# Patient Record
Sex: FEMALE | Race: OTHER | Employment: UNEMPLOYED | URBAN - METROPOLITAN AREA
[De-identification: names, ages, dates, MRNs, and addresses within clinical notes are randomized per-mention and may not be internally consistent; named-entity substitution may affect disease eponyms.]

---

## 2017-09-19 ENCOUNTER — HOSPITAL ENCOUNTER (OUTPATIENT)
Dept: LAB | Age: 27
Discharge: HOME | End: 2017-09-19

## 2017-09-19 ENCOUNTER — OFFICE VISIT (OUTPATIENT)
Dept: FAMILY MEDICINE CLINIC | Age: 27
End: 2017-09-19

## 2017-09-19 VITALS
HEART RATE: 44 BPM | WEIGHT: 157 LBS | DIASTOLIC BLOOD PRESSURE: 74 MMHG | TEMPERATURE: 97.6 F | SYSTOLIC BLOOD PRESSURE: 117 MMHG | BODY MASS INDEX: 26.8 KG/M2 | HEIGHT: 64 IN

## 2017-09-19 DIAGNOSIS — R07.9 CHEST PAIN, UNSPECIFIED TYPE: Primary | ICD-10-CM

## 2017-09-19 DIAGNOSIS — R07.9 CHEST PAIN, UNSPECIFIED TYPE: ICD-10-CM

## 2017-09-19 LAB
ALBUMIN SERPL-MCNC: 4.3 G/DL (ref 3.5–5)
ALBUMIN/GLOB SERPL: 1.1 {RATIO} (ref 1.1–2.2)
ALP SERPL-CCNC: 69 U/L (ref 45–117)
ALT SERPL-CCNC: 31 U/L (ref 12–78)
ANION GAP SERPL CALC-SCNC: 8 MMOL/L (ref 5–15)
AST SERPL-CCNC: 22 U/L (ref 15–37)
BASOPHILS # BLD: 0 K/UL (ref 0–0.1)
BASOPHILS NFR BLD: 0 % (ref 0–1)
BILIRUB SERPL-MCNC: 0.6 MG/DL (ref 0.2–1)
BUN SERPL-MCNC: 10 MG/DL (ref 6–20)
BUN/CREAT SERPL: 14 (ref 12–20)
CALCIUM SERPL-MCNC: 8.9 MG/DL (ref 8.5–10.1)
CHLORIDE SERPL-SCNC: 104 MMOL/L (ref 97–108)
CHOLEST SERPL-MCNC: 155 MG/DL
CO2 SERPL-SCNC: 29 MMOL/L (ref 21–32)
CREAT SERPL-MCNC: 0.72 MG/DL (ref 0.55–1.02)
EOSINOPHIL # BLD: 0.2 K/UL (ref 0–0.4)
EOSINOPHIL NFR BLD: 3 % (ref 0–7)
ERYTHROCYTE [DISTWIDTH] IN BLOOD BY AUTOMATED COUNT: 13.3 % (ref 11.5–14.5)
EST. AVERAGE GLUCOSE BLD GHB EST-MCNC: 117 MG/DL
GLOBULIN SER CALC-MCNC: 4 G/DL (ref 2–4)
GLUCOSE SERPL-MCNC: 83 MG/DL (ref 65–100)
HBA1C MFR BLD: 5.7 % (ref 4.2–6.3)
HCT VFR BLD AUTO: 38.4 % (ref 35–47)
HDLC SERPL-MCNC: 66 MG/DL
HDLC SERPL: 2.3 {RATIO} (ref 0–5)
HGB BLD-MCNC: 12.6 G/DL (ref 11.5–16)
LDLC SERPL CALC-MCNC: 68.4 MG/DL (ref 0–100)
LIPID PROFILE,FLP: NORMAL
LYMPHOCYTES # BLD: 2.1 K/UL (ref 0.8–3.5)
LYMPHOCYTES NFR BLD: 34 % (ref 12–49)
MCH RBC QN AUTO: 29.1 PG (ref 26–34)
MCHC RBC AUTO-ENTMCNC: 32.8 G/DL (ref 30–36.5)
MCV RBC AUTO: 88.7 FL (ref 80–99)
MONOCYTES # BLD: 0.3 K/UL (ref 0–1)
MONOCYTES NFR BLD: 5 % (ref 5–13)
NEUTS SEG # BLD: 3.5 K/UL (ref 1.8–8)
NEUTS SEG NFR BLD: 58 % (ref 32–75)
PLATELET # BLD AUTO: 389 K/UL (ref 150–400)
POTASSIUM SERPL-SCNC: 4.3 MMOL/L (ref 3.5–5.1)
PROT SERPL-MCNC: 8.3 G/DL (ref 6.4–8.2)
RBC # BLD AUTO: 4.33 M/UL (ref 3.8–5.2)
SODIUM SERPL-SCNC: 141 MMOL/L (ref 136–145)
TRIGL SERPL-MCNC: 103 MG/DL (ref ?–150)
TSH SERPL DL<=0.05 MIU/L-ACNC: 1.66 UIU/ML (ref 0.36–3.74)
VLDLC SERPL CALC-MCNC: 20.6 MG/DL
WBC # BLD AUTO: 6.1 K/UL (ref 3.6–11)

## 2017-09-19 PROCEDURE — 80061 LIPID PANEL: CPT | Performed by: NURSE PRACTITIONER

## 2017-09-19 PROCEDURE — 85025 COMPLETE CBC W/AUTO DIFF WBC: CPT | Performed by: NURSE PRACTITIONER

## 2017-09-19 PROCEDURE — 83036 HEMOGLOBIN GLYCOSYLATED A1C: CPT | Performed by: NURSE PRACTITIONER

## 2017-09-19 PROCEDURE — 84443 ASSAY THYROID STIM HORMONE: CPT | Performed by: NURSE PRACTITIONER

## 2017-09-19 PROCEDURE — 80053 COMPREHEN METABOLIC PANEL: CPT | Performed by: NURSE PRACTITIONER

## 2017-09-19 NOTE — PROGRESS NOTES
Assessment/Plan:       ICD-10-CM ICD-9-CM    1. Chest pain, unspecified type R07.9 786.50 LIPID PANEL      METABOLIC PANEL, COMPREHENSIVE      CBC WITH AUTOMATED DIFF      HEMOGLOBIN A1C WITH EAG      TSH 3RD GENERATION       [facility] [address]  Jean Paul Nesbitt  [address] [facility] Krystal Vitale [address] [facility] [address]  Phone: [phone] Fax: [phone]      CVAN- [address]  Subjective:     Chief Complaint   Patient presents with    Chest Pain     pt c/o pain on middle upper chest and radiates to neck    Abelino Senior is a 32 y.o. OTHER female. HPI: how long? Sometimes travels to the back. Starts in the morning, 3 hours after waking up. Has coffee - sugar and milk 2%. Lasts for 45 minutes. First started Wednesday. She didn't have it much yesterday. Today she is fasting. She  has a past medical history of Abdominal pain, generalized (4/23/2013); Acute bronchitis (4/23/2013); Allergic rhinitis, cause unspecified (4/23/2013); Anxiety state, unspecified (4/23/2013); Cardiac murmur, undiagnosed (4/23/2013); Carpal tunnel syndrome (4/23/2013); Contact dermatitis and other eczema, due to unspecified cause (4/23/2013); Gestational diabetes; Headache (4/23/2013); Low back pain (4/23/2013); Other acute sinusitis (4/23/2013); Other chest pain (4/23/2013); Pap smear for cervical cancer screening (07/2012); Pap smear for cervical cancer screening (07/31/2012); Shoulder pain (4/23/2013); Throat pain (4/23/2013); and URI (4/23/2013). She has Cardiac murmur, undiagnosed and Excess weight gain in pregnancy on her problem list. Doing exercise, not eating a lot of wheat. Left upper shoulder/back hurts. Review of Systems: Negative for: fever, shortness of breath, leg swelling, exertional dyspnea, palpitations.   Current Medications:   Current Outpatient Prescriptions on File Prior to Visit   Medication Sig    fluticasone (FLONASE) 50 mcg/actuation nasal spray 2 Sprays by Both Nostrils route daily.    cetirizine (ZYRTEC) 10 mg tablet Take 1 Tab by mouth nightly.  phenol throat spray (CHLORASEPTIC THROAT SPRAY) 1.4 % spray Take 1 Spray by mouth as needed.  omeprazole (PRILOSEC) 40 mg capsule Take 1 Cap by mouth Daily (before breakfast).  triamcinolone acetonide (KENALOG) 0.1 % topical cream Apply  to affected area two (2) times a day. use thin layer to the hands; Usa 2 veces al chris para picazon en las ryan    norethindrone (MICRONOR) 0.35 mg tablet Take 1 Tab by mouth daily.  PNV#16-Iron Fum & PS-FA-OM-3 35-1-200 mg cap Take 1 Cap by mouth daily. No current facility-administered medications on file prior to visit. Past Surgical History: She  has a past surgical history that includes  delivery only. Social and Family History: She  reports that she has never smoked. She has never used smokeless tobacco. She reports that she does not drink alcohol or use illicit drugs. ; family history includes Hypertension in her mother. Objective:     Vitals:    17 1031   BP: 117/74   Pulse: (!) 44   Temp: 97.6 °F (36.4 °C)   TempSrc: Oral   Weight: 157 lb (71.2 kg)   Height: 5' 3.78\" (1.62 m)    Patient's last menstrual period was 2017. Wt Readings from Last 2 Encounters:   17 157 lb (71.2 kg)   16 166 lb (75.3 kg)     Physical Examination:  General appearance - well developed, no acute distress. Chest - clear to auscultation. Heart - regular rate and rhythm without murmurs, rubs, or gallops. Abdomen - bowel sounds present x 4, NT, ND. Extremities - pulses intact. No peripheral edema. Assessment/Plan:   Diagnoses and all orders for this visit:    1. Chest pain, unspecified type  -     LIPID PANEL; Future  -     METABOLIC PANEL, COMPREHENSIVE; Future  -     CBC WITH AUTOMATED DIFF; Future  -     HEMOGLOBIN A1C WITH EAG; Future  -     TSH 3RD GENERATION; Future      Follow-up Disposition:  Return if symptoms worsen or fail to improve.    Tylenol 500mg, 2 po bid  For headache and back pain. Takes ibuprofen for headache, yesterday. Has been getting more headaches the last 1.5 months. She feels weak, eyes swollen the next morning. Have something with your coffee, or skip the coffee. Discussed heartburn. Natalie Barney, JAIRO, FNP-BC, BC-ADM  [place] expressed understanding of this plan.

## 2017-09-19 NOTE — PROGRESS NOTES
Coordination of Care  1. Have you been to the ER, urgent care clinic since your last visit? Hospitalized since your last visit? No    2. Have you seen or consulted any other health care providers outside of the [address] [place] [address] since your last visit? Include any pap smears or colon screening. No    Medications  Medication Reconciliation Performed: no  Patient does not need refills     Learning Assessment Complete?  yes

## 2017-12-19 ENCOUNTER — OFFICE VISIT (OUTPATIENT)
Dept: FAMILY MEDICINE CLINIC | Age: 27
End: 2017-12-19

## 2017-12-19 VITALS
BODY MASS INDEX: 28.17 KG/M2 | DIASTOLIC BLOOD PRESSURE: 56 MMHG | TEMPERATURE: 97.9 F | WEIGHT: 163 LBS | SYSTOLIC BLOOD PRESSURE: 107 MMHG | HEART RATE: 53 BPM

## 2017-12-19 DIAGNOSIS — Z23 ENCOUNTER FOR IMMUNIZATION: ICD-10-CM

## 2017-12-19 DIAGNOSIS — Z13.9 ENCOUNTER FOR SCREENING: Primary | ICD-10-CM

## 2017-12-19 LAB
BILIRUB UR QL STRIP: NEGATIVE
GLUCOSE UR-MCNC: NEGATIVE MG/DL
KETONES P FAST UR STRIP-MCNC: NEGATIVE MG/DL
PH UR STRIP: 6 [PH] (ref 4.6–8)
PROT UR QL STRIP: NEGATIVE
SP GR UR STRIP: 1.01 (ref 1–1.03)
UA UROBILINOGEN AMB POC: NORMAL (ref 0.2–1)
URINALYSIS CLARITY POC: CLEAR
URINALYSIS COLOR POC: YELLOW
URINE BLOOD POC: NEGATIVE
URINE LEUKOCYTES POC: NEGATIVE
URINE NITRITES POC: NEGATIVE

## 2017-12-19 RX ORDER — CYCLOBENZAPRINE HCL 10 MG
10 TABLET ORAL
Qty: 30 TAB | Refills: 1 | Status: SHIPPED | OUTPATIENT
Start: 2017-12-19 | End: 2018-04-12

## 2017-12-19 NOTE — PATIENT INSTRUCTIONS
Dolor di en la parte baja de la espalda: Ejercicios - [ Acute Low Back Pain: Exercises ]  Instrucciones de cuidado  Éstos son algunos ejemplos de ejercicios típicos de rehabilitación para melendez afección. Comience cada ejercicio lentamente. Reduzca la intensidad del ejercicio si Holcomb Rasher a sentir dolor. Melendez médico o el fisioterapeuta le dirán cuándo puede comenzar con estos ejercicios y cuáles funcionarán mejor para usted. Cuando no esté activo, encuentre norm posición cómoda para descansar. Algunas personas se sienten cómodas en el piso o en norm cama de firmeza media con norm almohada pequeña debajo de la rosie y otra debajo de edgard rodillas. Otras personas prefieren acostarse de lado con norm almohada entre las rodillas. No permanezca en norm posición por [facility]. Dé paseos cortos (10 a 20 minutos) cada 2 a 3 horas. Evite las pendientes, las colinas y las escaleras hasta que se sienta mejor. Sólo camine distancias que pueda manejar sin dolor, especialmente dolor en las piernas. Cómo se hacen los ejercicios  Estiramientos de la espalda    1. [facility] y las rodillas en el piso. 2. Relaje la rosie y [employer]. Arquee la espalda hacia el techo, hasta que sienta que las partes derek, media y baja se estiran agradablemente. Mantenga an estiramiento geo el tiempo que se sienta cómodo, o de 15 a 30 segundos. 3. Vuelva a la posición inicial con la espalda plana mientras está apoyado de ryan y rodillas. 4. Deje que melendez espalda se nivele empujando el [employer]. [address] (glúteos) hacia el techo. 5. Mantenga esta posición geo 15 a 30 segundos. 6. Repita de 2 a 4 veces. La atención de seguimiento es norm parte clave de melendez tratamiento y seguridad. Asegúrese de hacer y acudir a todas las citas, y llame a melendez médico si está teniendo problemas. También es norm buena idea saber los resultados de los exámenes y mantener norm lista de los medicamentos que zak.   ¿Dónde puede encontrar más información en inglés? Bryant Labella a http://luiz-tati.info/. Rolly Entiat L166 en la búsqueda para aprender más acerca de \"Dolor di en la parte baja de la espalda: Ejercicios - [ Acute Low Back Pain: Exercises ]. \"  Revisado: 21 marzo, 2017  Versión del contenido: 11.4  © 8469-4090 Healthwise, [facility]. Las instrucciones de cuidado fueron adaptadas bajo licencia por [facility] (which disclaims liability or warranty for this information). Si usted tiene [place]tholomew [place] afección médica o sobre estas instrucciones, siempre pregunte a melendez profesional de elsa. [employer], [facility] niega toda garantía o responsabilidad por melendez uso de esta información.

## 2017-12-19 NOTE — PROGRESS NOTES
HISTORY OF PRESENT ILLNESS  Naomi Brandt is a 32 y.o. female. HPI Comments: lbp on and off, mostly occas while doing core exercises at the gym. Can go for quite awhile with no problems, then will have pain, which sometimes seems to radiate to left pelvic area and sometimes down the left leg. Had nl [employer] at the  recently and [place] ocp from [place]. No h/o constipation. Denies pmh. Back Pain      Immunization/Injection         Review of Systems   Musculoskeletal: Positive for back pain. Physical Exam   Constitutional: She appears well-developed and well-nourished. No distress. Central adiposity   Abdominal: Soft. She exhibits no mass. There is tenderness. There is no guarding. Left pelvic area is tender with no palpable mass. Musculoskeletal:   FROM at low back, with some low back pain with extension. No tenderness. ASSESSMENT and PLAN  [facility] LBP. rx muscle relaxant to use occas when has severe sx. Discussed that mainstay of tx is exercises, good posture, weight loss. Gave exercises to help lessen spasm and hep with extension pain. Left pelvic pain.   If it persists, should come to us at [facility] or  for repeat pelvic exam.

## 2017-12-19 NOTE — PROGRESS NOTES
Coordination of Care  1. Have you been to the ER, urgent care clinic since your last visit? Hospitalized since your last visit? No    2. Have you seen or consulted any other health care providers outside of the [address] [place] [address] since your last visit? Include any pap smears or colon screening. No    Medications  Does the patient need refills?  NO    Learning Assessment Complete? yes    Results for orders placed or performed in visit on 12/19/17   AMB POC URINALYSIS DIP STICK AUTO W/O MICRO   Result Value Ref Range    Color (UA POC) Yellow     Clarity (UA POC) Clear     Glucose (UA POC) Negative Negative    Bilirubin (UA POC) Negative Negative    Ketones (UA POC) Negative Negative    Specific gravity (UA POC) 1.010 1.001 - 1.035    Blood (UA POC) Negative Negative    pH (UA POC) 6 4.6 - 8.0    Protein (UA POC) Negative Negative    Urobilinogen (UA POC) 0.2 mg/dL 0.2 - 1    Nitrites (UA POC) Negative Negative    Leukocyte esterase (UA POC) Negative Negative

## 2017-12-19 NOTE — PROGRESS NOTES
Digna Landry seen at d/c, given AVS and reviewed today's visit with patient. Digna Landry completed screening documentation which includes allergies to medications, latex and eggs/egg product. Patient denies fever. No contraindications for administering today's ordered vaccines. VIS given which includes instructions for adverse reaction. No adverse reaction noted at time of discharge, explained possible s/e. Reviewed symptoms indicating need to be seen in ER.   Patient given FLU vaccine by Nancy Conrad RN

## 2018-03-27 ENCOUNTER — OFFICE VISIT (OUTPATIENT)
Dept: FAMILY MEDICINE CLINIC | Age: 28
End: 2018-03-27

## 2018-03-27 VITALS
DIASTOLIC BLOOD PRESSURE: 78 MMHG | BODY MASS INDEX: 28.24 KG/M2 | WEIGHT: 165.4 LBS | HEART RATE: 50 BPM | TEMPERATURE: 97.9 F | HEIGHT: 64 IN | SYSTOLIC BLOOD PRESSURE: 143 MMHG

## 2018-03-27 DIAGNOSIS — H65.92 LEFT NON-SUPPURATIVE OTITIS MEDIA: Primary | ICD-10-CM

## 2018-03-27 DIAGNOSIS — R10.13 EPIGASTRIC PAIN: ICD-10-CM

## 2018-03-27 RX ORDER — AMOXICILLIN 500 MG/1
500 CAPSULE ORAL 3 TIMES DAILY
Qty: 30 CAP | Refills: 0 | Status: SHIPPED | OUTPATIENT
Start: 2018-03-27 | End: 2018-04-06

## 2018-03-27 NOTE — PROGRESS NOTES
Assessment/Plan:       ICD-10-CM ICD-9-CM    1. Left non-suppurative otitis media H65.92 381.4 amoxicillin (AMOXIL) 500 mg capsule   2. Epigastric pain R10.13 789.06      Follow-up Disposition:  Return if symptoms worsen or fail to improve. [address] [address] [facility]  [address] [address] [address]  Phone: [phone] Fax: [phone]  Subjective:     Chief Complaint   Patient presents with    Ear Pain     x 1 week    Abdominal Pain    Meron Jean Baptiste is a 32 y.o. OTHER female. HPI: a week ago, not all the time, comes and goes, stabbing. Has it a little right now. It was really strong 1 hour ago. No history of problems with ears. No runny or stuffy nose. No sneezing. 2 wks ago started epigastric and radiated to whole stomach, felt bloated. Right now not feeling this. Usually it is when she eats a lot. She  has a past medical history of Abdominal pain, generalized (4/23/2013); Acute bronchitis (4/23/2013); Allergic rhinitis, cause unspecified (4/23/2013); Anxiety state, unspecified (4/23/2013); Cardiac murmur, undiagnosed (4/23/2013); Carpal tunnel syndrome (4/23/2013); Contact dermatitis and other eczema, due to unspecified cause (4/23/2013); Gestational diabetes; ROOJPIXR(252.0) (4/23/2013); Low back pain (4/23/2013); Other acute sinusitis (4/23/2013); Other chest pain (4/23/2013); Pap smear for cervical cancer screening (07/2012); Pap smear for cervical cancer screening (07/31/2012); Shoulder pain (4/23/2013); Throat pain (4/23/2013); and URI (4/23/2013). She also has no past medical history of Abnormal Pap smear; Anemia; Asthma; Chlamydia; Complication of anesthesia; DVT (deep venous thrombosis) ([facility] [address] [facility] Eclampsia; Ectopic pregnancy; Genital herpes; Gonorrhea; Herpes gestationis; Herpes simplex without mention of complication; Human immunodeficiency virus (HIV) disease; Hypertension;  Hyperthyroidism; Hypothyroidism; Infertility, female; IUFD (intrauterine fetal death); IUGR (intrauterine growth restriction); Kidney disease; Liver disease; Molar pregnancy; Phlebitis and thrombophlebitis of unspecified site; Pituitary disorder ([facility] [address] Polycystic disease, ovaries; Postpartum depression; Preeclampsia;  delivery; Rhesus isoimmunization unspecified as to episode of care in pregnancy; Sickle-cell disease, unspecified; Syphilis; Systemic lupus erythematosus ([facility] Trauma; Type I diabetes mellitus ([facility] Unspecified breast disorder; or Unspecified epilepsy without mention of intractable epilepsy. Review of Systems: Negative for: fever, chest pain, shortness of breath, leg swelling, exertional dyspnea, palpitations. Current Medications:   Current Outpatient Prescriptions on File Prior to Visit   Medication Sig    cyclobenzaprine (FLEXERIL) 10 mg tablet Take 1 Tab by mouth nightly. Prn.  1 en la noche si necesita para dolor. No current facility-administered medications on file prior to visit. Social History: She  reports that she has never smoked. She has never used smokeless tobacco. She reports that she does not drink alcohol or use illicit drugs. Objective:     Vitals:    18 0841   BP: 143/78   Pulse: (!) 50   Temp: 97.9 °F (36.6 °C)   TempSrc: Oral   Weight: 165 lb 6.4 oz (75 kg)   Height: 5' 3.58\" (1.615 m)    Patient's last menstrual period was 2018. Wt Readings from Last 2 Encounters:   18 165 lb 6.4 oz (75 kg)   17 163 lb (73.9 kg)     No results found for any visits on 18. Physical Examination:  Left TM bulging, clear. General appearance - well developed, no acute distress. Chest - clear to auscultation. Heart - regular rate and rhythm without murmurs, rubs, or gallops. Abdomen - bowel sounds present x 4, NT, ND. Extremities - pulses intact. No peripheral edema. Assessment/Plan:   Diagnoses and all orders for this visit:    1.  Left non-suppurative otitis media  -     amoxicillin (AMOXIL) 500 mg capsule; Take 1 Cap by mouth three (3) times daily for 10 days. Bengali sig    2. Epigastric pain    Follow-up Disposition:  Return if symptoms worsen or fail to improve. No abdominal pain now. Advised to pay attention to circumstances surrounding overeating - does she forgo eating all day, then overeat? Or do certain types of foods cause stomach upset? Return if problem returns. She has regular periods. Bella Johnson, JAIRO, FNP-BC, BC-ADM  [place] expressed understanding of this plan.

## 2018-03-27 NOTE — PROGRESS NOTES
Coordination of Care  1. Have you been to the ER, urgent care clinic since your last visit? Hospitalized since your last visit? No    2. Have you seen or consulted any other health care providers outside of the [address] [place] [address] since your last visit? Include any pap smears or colon screening. No    Does the patient need refills? NO    Learning Assessment Complete?  yes

## 2018-03-27 NOTE — PATIENT INSTRUCTIONS
Infección del oído (otitis media): Instrucciones de cuidado - [ Ear Infection (Otitis Media): Care Instructions ]  Instrucciones de cuidado    Norm infección de oído podría comenzar con un resfriado y afectar el oído medio (otitis media). Puede doler mucho. La mayoría de las infecciones de oído sanan por sí solas en un par de días. A menudo, no se necesitan antibióticos. La razón es que muchas infecciones de oído están causadas por virus. Los antibióticos no funcionan contra los virus. Las dosis regulares de analgésicos (medicamentos para el dolor) son la mejor manera de reducir la fiebre y ayudarle a sentirse mejor. La atención de seguimiento es norm parte clave de melendez tratamiento y seguridad. Asegúrese de hacer y acudir a todas las citas, y llame a melendez médico si está teniendo problemas. También es norm buena idea saber los resultados de los exámenes y mantener norm lista de los medicamentos que zak. ¿Cómo puede cuidarse en el hogar? · [employer] analgésicos exactamente mayra le fueron indicados. ¨ Si el médico le recetó un analgésico, tómelo según las indicaciones. ¨ Si no está tomando un analgésico recetado, tome britt de venta duong, mayra acetaminofén (Tylenol), ibuprofeno (Advil, Motrin) o naproxeno (Aleve). Sherry y siga todas las instrucciones de la [place]. ¨ No tome dos o más analgésicos al mismo tiempo a menos que el médico se lo haya indicado. Muchos analgésicos contienen acetaminofén, es decir, Tylenol. El exceso de acetaminofén (Tylenol) puede ser dañino. · Planee tomarse norm dosis completa de analgésico antes de acostarse. Dormir lo suficiente le ayudará a sentirse mejor. · Pruebe con norm toallita tibia húmeda en el oído. Erick vez le ayude a aliviar el dolor. · Si melendez médico le recetó antibióticos, tómelos según las indicaciones. No deje de tomarlos por el hecho de sentirse mejor. Debe clarence todos los antibióticos hasta terminarlos. ¿Cuándo debe pedir ayuda?   Llame a melendez médico ahora mismo o busque atención médica inmediata si:  ? · Tiene dolor de oído nuevo o que [place]. ? · Melendez oído presenta secreción de pus o pete nueva o que está aumentando. ? · Tiene fiebre junto con rigidez en el ilene o un dolor de rosie intenso. ? Preste especial atención a los cambios en melendez elsa y asegúrese de comunicarse con melendez médico si:  ? · Tiene síntomas nuevos o que empeoran. ? · No mejora después de cristel tomado un antibiótico geo 2 días. ¿Dónde puede encontrar más información en inglés? Conrado Presume a http://luiz-tati.info/. Deronda Manger en la búsqueda para aprender más acerca de \"Infección del oído (otitis media): Instrucciones de cuidado - [ Ear Infection (Otitis Media): Care Instructions ]. \"  Revisado: 12 alcala, 2017  Versión del contenido: 11.4  © 7465-9179 Healthwise, Incorporated. Las instrucciones de cuidado fueron adaptadas bajo licencia por [facility] [employer] (which disclaims liability or warranty for this information). Si usted tiene [place] afección médica o sobre estas instrucciones, siempre pregunte a melendez profesional de elsa. Healthwise, Incorporated niega toda garantía o responsabilidad por melendez uso de esta información.

## 2018-04-04 ENCOUNTER — HOSPITAL ENCOUNTER (EMERGENCY)
Age: 28
Discharge: HOME | End: 2018-04-04
Attending: EMERGENCY MEDICINE
Payer: SELF-PAY

## 2018-04-04 VITALS
DIASTOLIC BLOOD PRESSURE: 87 MMHG | RESPIRATION RATE: 16 BRPM | SYSTOLIC BLOOD PRESSURE: 137 MMHG | OXYGEN SATURATION: 100 % | BODY MASS INDEX: 28.17 KG/M2 | HEART RATE: 54 BPM | HEIGHT: 64 IN | WEIGHT: 165 LBS | TEMPERATURE: 98.1 F

## 2018-04-04 DIAGNOSIS — H60.92 OTITIS EXTERNA OF LEFT EAR, UNSPECIFIED CHRONICITY, UNSPECIFIED TYPE: Primary | ICD-10-CM

## 2018-04-04 PROCEDURE — 99282 EMERGENCY DEPT VISIT SF MDM: CPT

## 2018-04-04 NOTE — DISCHARGE INSTRUCTIONS
Oído de nadador: Instrucciones de cuidado - [ Swimmer's Ear: Care Instructions ]  Instrucciones de cuidado    El oído de nadador (otitis externa) es norm inflamación o infección del canal auditivo. An es el conducto que va del oído externo hasta el tímpano. Cualquier residuo de [place], arena u otros que entren al canal [place] y permanezcan allí pueden causar oído de nadador. Introducirse hisopos de algodón u otros objetos en el oído para limpiarlo también puede causar an problema. El oído de nadador puede ser [employer]. Rhianna se puede tratar [employer] y la infección con medicamentos. Usted debería sentirse mejor en algunos días. La atención de seguimiento es norm parte clave de melendez tratamiento y seguridad. Asegúrese de hacer y acudir a todas las citas, y llame a melendez médico si está teniendo problemas. También es norm buena idea saber los resultados de los exámenes y mantener norm lista de los medicamentos que zak. ¿Cómo puede cuidarse en el hogar? Limpieza y cuidado  · Aplíquese las gotas antibióticas mayra se lo indique el médico.  · No utilice gotas para los oídos (que no ramona gotas antibióticas) ni ninguna otra cosa dentro de los oídos a menos que melendez médico se lo haya indicado. · Evite que le entre agua en el oído hasta que pase el [place]. Utilice un algodón cubierto con un poco de vaselina mayra tapón. No use tapones de plástico.  · Utilice un secador de pelo a baja temperatura para secarse el oído con cuidado después de ducharse. · Para aliviar el dolor, póngase norm toallita tibia [place]. · [employer] analgésicos (medicamentos para el dolor) exactamente según las indicaciones. ¨ Si el médico le recetó un analgésico, tómelo según las indicaciones. ¨ Si no está tomando un analgésico recetado, pregúntele a melendez médico si puede clarence britt de [employer]. Aplicación de gotas para los oídos  · [employer] gotas a la temperatura corporal haciendo rodar el recipiente [employer].  O puede ponerlo en norm taza de agua tibia geo algunos minutos. · Acuéstese, con el oído [place]. · Póngase gotas dentro del oído. Siga las instrucciones de melendez médico (o las indicaciones de la etiqueta) para saber cuántas gotas usar. Mueva melendez oreja con suavidad o tire de edgardo [place] y [place] atrás para ayudar a que las gotas entren en el oído. · Es importante mantener el líquido en el canal auditivo por entre 3 y 5 minutos. ¿Cuándo debe pedir ayuda? Llame a melendez médico ahora mismo o busque atención médica inmediata si:  ? · Tiene fiebre nueva o más derek. ? · Tiene nuevo o mayor dolor, hinchazón, calor o enrojecimiento alrededor o detrás de la oreja. ? · Melendez oído presenta secreción de pus o pete nueva o que está aumentando. ?Preste especial atención a los cambios en melendez elsa y asegúrese de comunicarse con melendez médico si:  ? · No está mejorando después de 2 días (48 horas). ¿Dónde puede encontrar más información en inglés? [employer] [place] a http://luiz-tati.info/. Ryley Powell [id number] en la búsqueda para aprender más acerca de \"Oído de nadador: Instrucciones de cuidado - [ Swimmer's Ear: Care Instructions ]. \"  Revisado: 12 alcala, 2017  Versión del contenido: 11.4  © 0689-4069 Healthwise, Incorporated. Las instrucciones de cuidado fueron adaptadas bajo licencia por [employer] (which disclaims liability or warranty for this information). Si usted tiene [place] afección médica o sobre estas instrucciones, siempre pregunte a melendez profesional de elsa. Healthwise, Incorporated niega toda garantía o responsabilidad por melendez uso de esta información.

## 2018-04-04 NOTE — ED PROVIDER NOTES
HPI Comments: 32 y.o. female with no significant past medical history who presents from home with chief complaint of ear pain. History mostly provided by the patient's friend, who is able to translate. Patient states onset of moderate left-sided ear pain over the last 2 weeks that has progressively worsened. Patient denies fever, cough, congestion, change in hearing, or putting anything in her ear. There are no other acute medical concerns at this time. Old Chart Review: Per note, patient was evaluated by her PCP on 18 for left ear pain and f/u of recurrent abdominal pain. She was started on Amoxicillin for her ear. Social hx: Tobacco Use: No, Alcohol Use: No, Drug Use: No    PCP: Jose J Mcbride MD    Note written by Travis Stack, as dictated by Pato Alicea MD 7:39 AM      The history is provided by the patient and a friend. The history is limited by a language barrier. A  was used (friend).         Past Medical History:   Diagnosis Date    Abdominal pain, generalized 2013    Acute bronchitis 2013    Allergic rhinitis, cause unspecified 2013    Anxiety state, unspecified 2013    Cardiac murmur, undiagnosed 2013    Carpal tunnel syndrome 2013    Contact dermatitis and other eczema, due to unspecified cause 2013    Gestational diabetes     Headache(784.0) 2013    Low back pain 2013    Other acute sinusitis 2013    Other chest pain 2013    Pap smear for cervical cancer screening 2012    Normal    Pap smear for cervical cancer screening 2012    Shoulder pain 2013    Throat pain 2013    URI 2013       Past Surgical History:   Procedure Laterality Date     DELIVERY ONLY           Family History:   Problem Relation Age of Onset    Hypertension Mother        Social History     Social History    Marital status: SINGLE     Spouse name: N/A    Number of children: N/A    Years of education: N/A     Occupational History    Not on file. Social History Main Topics    Smoking status: Never Smoker    Smokeless tobacco: Never Used    Alcohol use No    Drug use: No    Sexual activity: Yes     Partners: Male     Birth control/ protection: None, Pill     Other Topics Concern    Not on file     Social History Narrative    ** Merged History Encounter **              ALLERGIES: Other medication    Review of Systems   Constitutional: Negative. Negative for appetite change, fever and unexpected weight change. HENT: Positive for ear pain (left). Negative for hearing loss, nosebleeds, rhinorrhea, sore throat and trouble swallowing. Respiratory: Negative. Negative for cough, chest tightness and shortness of breath. Cardiovascular: Negative. Negative for chest pain and palpitations. Gastrointestinal: Negative. Negative for abdominal distention, abdominal pain, blood in stool and vomiting. Endocrine: Negative. Genitourinary: Negative for dysuria and hematuria. Musculoskeletal: Negative. Negative for back pain and myalgias. Skin: Negative. Negative for rash. Allergic/Immunologic: Negative. Neurological: Negative. Negative for dizziness, syncope, weakness and numbness. Hematological: Negative. Psychiatric/Behavioral: Negative. All other systems reviewed and are negative. Vitals:    04/04/18 0717   BP: 137/87   Pulse: (!) 54   Resp: 16   Temp: 98.1 °F (36.7 °C)   SpO2: 100%   Weight: 74.8 kg (165 lb)   Height: 5' 4\" (1.626 m)            Physical Exam   Constitutional: She is oriented to person, place, and time. She appears well-developed and well-nourished. No distress. HENT:   Head: Normocephalic and atraumatic. Right Ear: External ear normal.   Nose: Nose normal.   Mouth/Throat: Oropharynx is clear and moist.   Mild inflammation of the left external ear canal. No discrete lesion. TMs are intact. No erythema. No middle layer effusion.    Hearing is normal.    Eyes: Conjunctivae and EOM are normal. Pupils are equal, round, and reactive to light. Neck: Normal range of motion. Neck supple. No JVD present. No thyromegaly present. No cervical lymphadenopathy    Cardiovascular: Normal rate, regular rhythm, normal heart sounds and intact distal pulses. No murmur heard. Pulmonary/Chest: Effort normal and breath sounds normal. No respiratory distress. She has no wheezes. She has no rales. Abdominal: Soft. Bowel sounds are normal. She exhibits no distension. There is no tenderness. Musculoskeletal: Normal range of motion. She exhibits no edema. Neurological: She is alert and oriented to person, place, and time. No cranial nerve deficit. Skin: Skin is warm and dry. No rash noted. Psychiatric: She has a normal mood and affect. Her behavior is normal. Thought content normal.      Note written by Travis Larson, as dictated by Elieser Garcia MD 7:39 AM    [facility]      ED Course       Procedures    Assessment and Plan: Otitis externa. Will treat with cortisporin otic for 1 week and have her f/u with PCP for recheck.

## 2018-04-12 ENCOUNTER — OFFICE VISIT (OUTPATIENT)
Dept: FAMILY MEDICINE CLINIC | Age: 28
End: 2018-04-12

## 2018-04-12 VITALS
DIASTOLIC BLOOD PRESSURE: 77 MMHG | BODY MASS INDEX: 29.43 KG/M2 | HEIGHT: 64 IN | TEMPERATURE: 97.8 F | WEIGHT: 172.4 LBS | SYSTOLIC BLOOD PRESSURE: 120 MMHG | HEART RATE: 51 BPM

## 2018-04-12 DIAGNOSIS — H60.20 MALIGNANT OTITIS EXTERNA, UNSPECIFIED CHRONICITY, UNSPECIFIED LATERALITY: Primary | ICD-10-CM

## 2018-04-12 NOTE — PROGRESS NOTES
Assessment/Plan:    Diagnoses and all orders for this visit:    1. Malignant otitis externa, unspecified chronicity, unspecified laterality    sxs have resolved with current rx of cortisporin otic solution  RTC PRN     Follow-up Disposition:  Return if symptoms worsen or fail to improve. AJ Maldonado expressed understanding of this plan. An AVS was printed and given to the patient.      ----------------------------------------------------------------------    Chief Complaint   Patient presents with    Ear Pain     f/u       History of Present Illness:  Pt here for ER f/up after seeking care for left ear pain. She had been on amoxicillin PO for about 5 days and spent 2 nights in agony and went to the ER where she was found to have OE. With current rx of cortisporin drops, her sxs have resolved       Past Medical History:   Diagnosis Date    Abdominal pain, generalized 4/23/2013    Acute bronchitis 4/23/2013    Allergic rhinitis, cause unspecified 4/23/2013    Anxiety state, unspecified 4/23/2013    Cardiac murmur, undiagnosed 4/23/2013    Carpal tunnel syndrome 4/23/2013    Contact dermatitis and other eczema, due to unspecified cause 4/23/2013    Gestational diabetes     Headache(784.0) 4/23/2013    Low back pain 4/23/2013    Other acute sinusitis 4/23/2013    Other chest pain 4/23/2013    Pap smear for cervical cancer screening 07/2012    Normal    Pap smear for cervical cancer screening 07/31/2012    Shoulder pain 4/23/2013    Throat pain 4/23/2013    URI 4/23/2013       Current Outpatient Prescriptions   Medication Sig Dispense Refill    neomycin-colist-hydrocortisone-thonzonium (CORTISPORIN TC,COLY-MYCIN S) otic suspension Administer 3 Drops in left ear four (4) times daily.  10 mL 0       Allergies   Allergen Reactions    Other Medication Anaphylaxis     Apples, plums, cherries       Social History   Substance Use Topics    Smoking status: Never Smoker    Smokeless tobacco: Never Used    Alcohol use No       Family History   Problem Relation Age of Onset    Hypertension Mother        Physical Exam:     Visit Vitals    /77 (BP 1 Location: Right arm)    Pulse (!) 51    Temp 97.8 °F (36.6 °C) (Oral)    Ht 5' 4.02\" (1.626 m)    Wt 172 lb 6.4 oz (78.2 kg)    LMP 03/27/2018    BMI 29.58 kg/m2       A&Ox3  WDWN NAD  Respirations normal and non labored  Left ear canal- no redness, inflammation or pus.  TM intact w/out effusion

## 2018-04-12 NOTE — PROGRESS NOTES
Coordination of Care  1. Have you been to the ER, urgent care clinic since your last visit? Hospitalized since your last visit? Yes When: [facility],04/04/2018, ear pain    2. Have you seen or consulted any other health care providers outside of the [facility] since your last visit? Include any pap smears or colon screening. No    Does the patient need refills? NO    Learning Assessment Complete?  yes

## 2019-03-04 ENCOUNTER — OFFICE VISIT (OUTPATIENT)
Dept: FAMILY MEDICINE CLINIC | Age: 29
End: 2019-03-04

## 2019-03-04 VITALS
HEIGHT: 64 IN | SYSTOLIC BLOOD PRESSURE: 112 MMHG | OXYGEN SATURATION: 100 % | BODY MASS INDEX: 30.05 KG/M2 | WEIGHT: 176 LBS | RESPIRATION RATE: 18 BRPM | DIASTOLIC BLOOD PRESSURE: 69 MMHG | TEMPERATURE: 98.2 F | HEART RATE: 60 BPM

## 2019-03-04 DIAGNOSIS — R11.0 NAUSEA: ICD-10-CM

## 2019-03-04 DIAGNOSIS — A08.4 VIRAL GASTROENTERITIS: Primary | ICD-10-CM

## 2019-03-04 DIAGNOSIS — R10.9 ABDOMINAL PAIN, UNSPECIFIED ABDOMINAL LOCATION: ICD-10-CM

## 2019-03-04 LAB

## 2019-03-04 RX ORDER — ONDANSETRON 4 MG/1
4 TABLET, ORALLY DISINTEGRATING ORAL
Qty: 10 TAB | Refills: 0 | Status: SHIPPED | OUTPATIENT
Start: 2019-03-04 | End: 2022-05-11 | Stop reason: ALTCHOICE

## 2019-03-04 NOTE — PROGRESS NOTES
HPI     Chief Complaint   Patient presents with    Abdominal Pain     X5 days,constipation     She is a 25 y.o. female who presents for abdominal pain. Abd Pain  - Ate bad hamburger at [facility] last Tuesday  - Diffuse abd cramping Wed-Sat  - Has felt better since Sunday  - Endorses occasional nausea with eating heavy foods  - Having BM daily, denies dysuria    Declined interpretor services, waiver completed. Review of Systems   Constitutional: Negative for chills and fever. Gastrointestinal: Positive for abdominal pain and nausea. Negative for constipation, diarrhea and vomiting. Reviewed PmHx, RxHx, FmHx, SocHx, AllgHx and updated and dated in the chart. Physical Exam:  Visit Vitals  /69   Pulse 60   Temp 98.2 °F (36.8 °C) (Oral)   Resp 18   Ht 5' 4.02\" (1.626 m)   Wt 176 lb (79.8 kg)   LMP 02/24/2019   SpO2 100%   BMI 30.19 kg/m²     Physical Exam   Constitutional: She appears well-developed and well-nourished. No distress. Cardiovascular: Normal rate, regular rhythm and normal heart sounds. Exam reveals no gallop and no friction rub. No murmur heard. Pulmonary/Chest: Effort normal and breath sounds normal. No stridor. No respiratory distress. She has no wheezes. She has no rales. Abdominal: Soft. Bowel sounds are normal. She exhibits no distension and no mass. There is no tenderness. There is no rebound and no guarding. Skin: Skin is warm and dry. She is not diaphoretic. Psychiatric: She has a normal mood and affect. Her behavior is normal.   Nursing note and vitals reviewed. UA trace blood, neg UPT  No results found for this or any previous visit (from the past 12 hour(s)).      Assessment / Plan     Viral gastroenteritis  - UA wo signs of infection, UPT neg  - Drink plenty of fluids, Gatorade  - [place] diet, BRAT (bananas, rice, applesauce, toast)  - Zofran 4mg q8h PRN nausea 10 tabs  - ED precautions given, RTC if sx do not improve or worsen    Follow up PRN    I have discussed the diagnosis with the patient and the intended plan as seen in the above orders. The patient has received an after-visit summary and questions were answered concerning future plans. I have discussed medication side effects and warnings with the patient as well.     Patient discussed with Dr. Sd Shepard (Attending)    Eliane Austin DO  Family Medicine Resident

## 2019-03-04 NOTE — PROGRESS NOTES
Chief Complaint   Patient presents with    Abdominal Pain     X5 days,constipation     1. Have you been to the ER, urgent care clinic since your last visit? Hospitalized since your last visit? No    2. Have you seen or consulted any other health care providers outside of the [address] [place] [address] since your last visit? Include any pap smears or colon screening.  No

## 2019-03-04 NOTE — PATIENT INSTRUCTIONS
Dolor abdominal: Instrucciones de cuidado - [ Abdominal Pain: Care Instructions ]  Instrucciones de cuidado    El dolor abdominal tiene muchas causas posibles. Algunas de ellas no son graves y mejoran por sí solas en unos días. Otras requieren [place] y [place]. Si melendez dolor continúa o [place], necesitará norm nueva revisión y [place] pruebas para determinar qué pasa. Es posible que necesite cirugía para corregir el problema. No ignore nuevos síntomas, mayra fiebre, náuseas y [place], [facility], dolor que [place] o [place]. Podrían ser señales de un problema más grave. Melendez médico puede haberle recomendado norm consulta de [employer]enny [employer] Eric las 8 o 12 horas siguientes. Si no se siente mejor, es posible que requiera [facility] [place] o [place]. El médico lo resendiz revisado minuciosamente, lucy puede cristel problemas más tarde. Si nota algún problema o síntomas nuevos, busque tratamiento médico inmediatamente. La atención de seguimiento es norm parte clave de melendez tratamiento y seguridad. Asegúrese de hacer y acudir a todas las citas, y llame a melendez médico si está teniendo problemas. También es norm buena idea saber los resultados de edgard exámenes y mantener norm lista de los medicamentos que zak. ¿Cómo puede cuidarse en el hogar? · Descanse hasta que se sienta mejor. · Para prevenir la deshidratación, soraya abundantes líquidos, suficientes para que melendez orina sea de color amarillo eleanor o transparente mayra el agua. Elija beber agua y otros líquidos ansley sin cafeína hasta que se sienta mejor. Si tiene [employer], del corazón o del hígado y tiene que Don's líquidos, hable con melendez médico antes de aumentar melendez consumo. · Si tiene [employer], coma alimentos suaves, mayra arroz, pan immanuel seco o galletas saladas, bananas (plátanos) y puré de Synchari. Trate de comer varias comidas pequeñas al día en lugar de dos o amina grandes.   · Espere hasta 50 horas después de que KATELYN Corrales hayan desaparecido antes de comer alimentos condimentados, alcohol y bebidas que contengan cafeína. · No consuma alimentos ricos en grasa. · Evite medicamentos antiinflamatorios mayra aspirina, ibuprofeno (Advil, Motrin) y naproxeno (Aleve). Pueden causar [employer]. Dígale a melendez médico si está tomando aspirina diariamente debido a otro problema de elsa. ¿Cuándo debe pedir ayuda? Llame al 911 en cualquier momento que considere que necesita atención de emergencia. Por ejemplo, llame si:    · Se desmayó (perdió el conocimiento).   · Las heces son de color rojizo o muy sanguinolentas (con pete).   · Vomita pete o algo parecido a granos de café molido.     · Tiene dolor abdominal nuevo e intenso.    Llame a melendez médico ahora mismo o busque atención médica inmediata si:    · Melendez dolor empeora, sobre todo si se concentra en norm [place] parte del vientre.     · Vuelve a tener fiebre o tiene fiebre más derek.     · Amy heces son negruzcas y parecidas al alquitrán o tienen rastros de pete.     · Tiene sangrado vaginal inesperado.     · Tiene síntomas de norm infección del tracto urinario. Estos podrían incluir:  ? Dolor al Jodee Abrams. ? Orinar con más frecuencia que lo habitual.  ? Pete en la [facility].     · Siente mareos o aturdimiento, o que está a punto de desmayarse.    Preste especial atención a los cambios en melendez elsa y asegúrese de comunicarse con melendez médico si:    · No está mejorando después de 1 día (24 horas). ¿Dónde puede encontrar más información en inglés? Yousif File a http://luiz-tati.info/. Gracy Mckeon T182 en la búsqueda para aprender más acerca de \"Dolor abdominal: Instrucciones de cuidado - [ Abdominal Pain: Care Instructions ]. \"  Revisado: 23 septiembre, 2018  Versión del contenido: 11.9  © 9452-2659 [employer]. Las instrucciones de cuidado fueron adaptadas bajo licencia por [employer] (which disclaims liability or warranty for this information).  Si usted tiene preguntas sobre norm afección médica o sobre estas instrucciones, siempre pregunte a melendez profesional de elsa. [employer] niega toda garantía o responsabilidad por melendez uso de esta información.           Dieta blanda de textura suave: Instrucciones de cuidado - [ Soft-Textured, [place] Diet: Care Instructions ]  Instrucciones de cuidado    [place] dieta blanda de textura suave se [place] cuando usted necesita alimentos que ramona fáciles de masticar, tragar y digerir. Deberá elegir alimentos blandos que no estén muy condimentados. Deberá evitar los alimentos altos en grasa, además de la cafeína y el alcohol. Melendez médico o dietista puede ayudarle a planificar norm dieta blanda de textura [place] apropiada para melendez elsa y que sea de melendez gusto. Pregúntele a melendez médico por cuánto tiempo deberá seguir esta dieta. A medida que vaya mejorando, es probable que pueda regresar a emlendez dieta habitual. Consulte a melendez médico o dietista antes de hacer cambios en melendez dieta. La atención de seguimiento es norm parte clave de melendez tratamiento y seguridad. Asegúrese de hacer y acudir a todas las citas, y llame a melendez médico si está teniendo problemas. También es norm buena idea saber los resultados de edgard exámenes y mantener norm lista de los medicamentos que zak. ¿Cómo puede cuidarse en el hogar? · Elija alimentos fáciles de masticar y tragar. Pamelia Potter opciones son el puré de papa, el pan y los panecillos blandos, las sopas tipo crema, la charanjit y [place] [address]" (crema de roberta). · Elija verduras suaves y maxwell cocidas, y frutas blandas o enlatadas. Algunas buenas opciones son el puré de Synchari, las bananas (plátanos) maduras y los jugos de frutas no cítricas. · Pruebe clarence Linneus, yogur u otros productos lácteos si puede digerirlos sin [employer]. Melendez médico podría limitar por un tiempo el consumo de Linneus y edgard derivados.  Si es así, podría recomendarle un suplemento de calcio y vitamina D.  · Elija alimentos suaves con proteína, mayra los [place], el tofu, el pescado al vapor, el tacos y [place]. Los métodos de cocción lenta, mayra guisar, ayudarán a suavizar la carne. [place]oler la carne en un procesador de alimentos o norm licuadora también hará que sea más fácil de comer. · Evite las nueces, las verduras crudas, las [address] duras y las baldo duras, así mayra las ciruelas pasas y el jugo de ciruelas pasas. · Evite los alimentos muy condimentados, mayra los que se sazonan con ruby timothy, chiles, rábano picante o salsa picante. · Evite los alimentos muy ácidos, mayra las frutas cítricas y edgard jugos, así mayra los alimentos que [place]. · Evite los alimentos de alto contenido de grasa, mayra la carne frita, los \"chips\" (tipo zay fritas) y los postres pesados. · Consulte a melendez médico antes de consumir alcohol o bebidas que contengan cafeína, mayra el café, el té y las bebidas cola. ¿Dónde puede encontrar más información en inglés? Luis Enrique Polk a http://luiz-tati.info/. Iván Fails Q187 en la búsqueda para aprender más acerca de \"Dieta blanda de textura suave: Instrucciones de cuidado - [ Soft-Textured, [place] Diet: Care Instructions ]. \"  Revisado: [address], 2018  Versión del contenido: 11.9  © 5743-1466 Healthwise, Incorporated. Las instrucciones de cuidado fueron adaptadas bajo licencia por [employer] (which disclaims liability or warranty for this information). Si usted tiene [place] afección médica o sobre estas instrucciones, siempre pregunte a melendez profesional de elsa.  Healthwise, Incorporated niega toda garantía o responsabilidad por melendez uso de esta información.

## 2020-03-17 ENCOUNTER — TELEPHONE (OUTPATIENT)
Dept: FAMILY MEDICINE CLINIC | Age: 30
End: 2020-03-17

## 2020-03-17 NOTE — TELEPHONE ENCOUNTER
Patient calling wanting to be seen at the [facility] due to on and off chest pain for the last three months, per patient she was told a long time ago she had a heart murmur, no other dx related to the heart. No chest pain at this time. Per patient the pain in chest is not associated with SOB, Nausea/Vomiting, dizziness, constipation, diarrhea, dizziness, jaw pain, or arm pain. Per patient it happens after exercising, and on Saturday she noticed that her hands got swollen. Chart reviewed, patient was last seen at the [place] [address] almost 2 years ago, making her a new patient per our protocol. Due to the current situation the [facility] is not accepting new patient. Discussed with patient that she can try calling [address] [place] [address] where she still may be considered a patient or go to the urgent care. Clinic information provided. Discussed that if at any given time she experiences chest pain, with SOB, Nausea/Vomiting, diszziness, constipation, diarrhea, dizziness, jaw pain, or arm pain, to go to the nearest ER. This has been fully explained to the patient, who indicates understanding and agrees with plan. No further questions at this time. Routing to provider for any additional recommendations.  Jackie Jane RN

## 2021-01-06 ENCOUNTER — TELEPHONE (OUTPATIENT)
Dept: FAMILY MEDICINE CLINIC | Age: 31
End: 2021-01-06

## 2021-01-12 NOTE — TELEPHONE ENCOUNTER
----- Message from Axel Simon LPN sent at 8/9/6713  4:48 PM EST -----  Regarding: RE: Jeremy/herve  This patient has not been seen since march. Please schedule virtual visit.  ----- Message -----  From: Wayne Welch  Sent: 1/6/2021   4:00 PM EST  To: Axel Simon LPN  Subject: FW: Jeremy/herve                             ----- Message -----  From: Trent Cleveland  Sent: 1/5/2021  10:43 AM EST  To: [facility] Front Office  Subject: Jeremy/herve                               Pt is requesting for you to call her in regard to scheduling an ultra sound. Pts number [phone]. She does speak [place]

## 2021-01-20 ENCOUNTER — HOSPITAL ENCOUNTER (OUTPATIENT)
Dept: LAB | Age: 31
Discharge: HOME | End: 2021-01-20

## 2021-01-20 ENCOUNTER — OFFICE VISIT (OUTPATIENT)
Dept: FAMILY MEDICINE CLINIC | Age: 31
End: 2021-01-20

## 2021-01-20 VITALS
HEART RATE: 87 BPM | DIASTOLIC BLOOD PRESSURE: 75 MMHG | WEIGHT: 171.8 LBS | OXYGEN SATURATION: 97 % | SYSTOLIC BLOOD PRESSURE: 111 MMHG | HEIGHT: 63 IN | TEMPERATURE: 97 F | BODY MASS INDEX: 30.44 KG/M2

## 2021-01-20 DIAGNOSIS — R10.11 RIGHT UPPER QUADRANT ABDOMINAL PAIN: ICD-10-CM

## 2021-01-20 DIAGNOSIS — M25.562 ACUTE PAIN OF LEFT KNEE: ICD-10-CM

## 2021-01-20 DIAGNOSIS — S83.8X2A INJURY OF MENISCUS OF LEFT KNEE, INITIAL ENCOUNTER: ICD-10-CM

## 2021-01-20 DIAGNOSIS — R10.11 RIGHT UPPER QUADRANT ABDOMINAL PAIN: Primary | ICD-10-CM

## 2021-01-20 LAB
ALBUMIN SERPL-MCNC: 4.6 G/DL (ref 3.5–5)
ALBUMIN/GLOB SERPL: 1.4 {RATIO} (ref 1.1–2.2)
ALP SERPL-CCNC: 63 U/L (ref 45–117)
ALT SERPL-CCNC: 54 U/L (ref 12–78)
ANION GAP SERPL CALC-SCNC: 6 MMOL/L (ref 5–15)
APPEARANCE UR: CLEAR
AST SERPL-CCNC: 61 U/L (ref 15–37)
BASOPHILS # BLD: 0 K/UL (ref 0–0.1)
BASOPHILS NFR BLD: 1 % (ref 0–1)
BILIRUB SERPL-MCNC: 0.4 MG/DL (ref 0.2–1)
BILIRUB UR QL: NEGATIVE
BUN SERPL-MCNC: 16 MG/DL (ref 6–20)
BUN/CREAT SERPL: 25 (ref 12–20)
CALCIUM SERPL-MCNC: 10 MG/DL (ref 8.5–10.1)
CHLORIDE SERPL-SCNC: 104 MMOL/L (ref 97–108)
CO2 SERPL-SCNC: 27 MMOL/L (ref 21–32)
COLOR UR: NORMAL
CREAT SERPL-MCNC: 0.63 MG/DL (ref 0.55–1.02)
DIFFERENTIAL METHOD BLD: ABNORMAL
EOSINOPHIL # BLD: 0.2 K/UL (ref 0–0.4)
EOSINOPHIL NFR BLD: 3 % (ref 0–7)
ERYTHROCYTE [DISTWIDTH] IN BLOOD BY AUTOMATED COUNT: 12.1 % (ref 11.5–14.5)
GLOBULIN SER CALC-MCNC: 3.3 G/DL (ref 2–4)
GLUCOSE SERPL-MCNC: 85 MG/DL (ref 65–100)
GLUCOSE UR STRIP.AUTO-MCNC: NEGATIVE MG/DL
HCT VFR BLD AUTO: 38.1 % (ref 35–47)
HGB BLD-MCNC: 12.7 G/DL (ref 11.5–16)
HGB UR QL STRIP: NEGATIVE
IMM GRANULOCYTES # BLD AUTO: 0 K/UL (ref 0–0.04)
IMM GRANULOCYTES NFR BLD AUTO: 0 % (ref 0–0.5)
KETONES UR QL STRIP.AUTO: NEGATIVE MG/DL
LEUKOCYTE ESTERASE UR QL STRIP.AUTO: NEGATIVE
LYMPHOCYTES # BLD: 1.8 K/UL (ref 0.8–3.5)
LYMPHOCYTES NFR BLD: 29 % (ref 12–49)
MCH RBC QN AUTO: 29.5 PG (ref 26–34)
MCHC RBC AUTO-ENTMCNC: 33.3 G/DL (ref 30–36.5)
MCV RBC AUTO: 88.6 FL (ref 80–99)
MONOCYTES # BLD: 0.4 K/UL (ref 0–1)
MONOCYTES NFR BLD: 6 % (ref 5–13)
NEUTS SEG # BLD: 3.9 K/UL (ref 1.8–8)
NEUTS SEG NFR BLD: 61 % (ref 32–75)
NITRITE UR QL STRIP.AUTO: NEGATIVE
NRBC # BLD: 0 K/UL (ref 0–0.01)
NRBC BLD-RTO: 0 PER 100 WBC
PH UR STRIP: 5.5 [PH] (ref 5–8)
PLATELET # BLD AUTO: 407 K/UL (ref 150–400)
PMV BLD AUTO: 10.2 FL (ref 8.9–12.9)
POTASSIUM SERPL-SCNC: 4.1 MMOL/L (ref 3.5–5.1)
PROT SERPL-MCNC: 7.9 G/DL (ref 6.4–8.2)
PROT UR STRIP-MCNC: NEGATIVE MG/DL
RBC # BLD AUTO: 4.3 M/UL (ref 3.8–5.2)
SODIUM SERPL-SCNC: 137 MMOL/L (ref 136–145)
SP GR UR REFRACTOMETRY: 1.01 (ref 1–1.03)
TSH SERPL DL<=0.05 MIU/L-ACNC: 1.91 UIU/ML (ref 0.36–3.74)
UROBILINOGEN UR QL STRIP.AUTO: 0.2 EU/DL (ref 0.2–1)
WBC # BLD AUTO: 6.4 K/UL (ref 3.6–11)

## 2021-01-20 PROCEDURE — 84443 ASSAY THYROID STIM HORMONE: CPT

## 2021-01-20 PROCEDURE — 80053 COMPREHEN METABOLIC PANEL: CPT

## 2021-01-20 PROCEDURE — 99214 OFFICE O/P EST MOD 30 MIN: CPT | Performed by: FAMILY MEDICINE

## 2021-01-20 PROCEDURE — 85025 COMPLETE CBC W/AUTO DIFF WBC: CPT

## 2021-01-20 PROCEDURE — 81003 URINALYSIS AUTO W/O SCOPE: CPT

## 2021-01-20 RX ORDER — DICLOFENAC SODIUM 75 MG/1
75 TABLET, DELAYED RELEASE ORAL 2 TIMES DAILY
Qty: 60 TAB | Refills: 0 | Status: SHIPPED | OUTPATIENT
Start: 2021-01-20 | End: 2022-05-11 | Stop reason: ALTCHOICE
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
## 2021-01-20 NOTE — PROGRESS NOTES
Coordination of Care  1. Have you been to the ER, urgent care clinic since your last visit? Hospitalized since your last visit? No    2. Have you seen or consulted any other health care providers outside of the [address] [place] [address] since your last visit? Include any pap smears or colon screening. No    Does the patient need refills? NO    Learning Assessment Complete?  yes  Depression Screening complete in the past 12 months? yes

## 2021-01-20 NOTE — TELEPHONE ENCOUNTER
PT seen on :  Office Visit  Open     1/20/2021  JV CARIAS - Rosales Amato MD  Family Medicine  Right upper quadrant abdominal pain +2 more  Dx  Abdominal Pain   , Knee Pain    Reason for Visit    Radiology scheduled for 1/25/2021    Closed enc

## 2021-01-21 NOTE — PROGRESS NOTES
Normal blood count, normal thyroid, normal urinalysis,  Normal kidney function, liver function, electrolytes  Patient can be informed
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
## 2021-01-25 ENCOUNTER — HOSPITAL ENCOUNTER (OUTPATIENT)
Dept: ULTRASOUND IMAGING | Age: 31
Discharge: HOME | End: 2021-01-25
Attending: FAMILY MEDICINE

## 2021-01-25 ENCOUNTER — HOSPITAL ENCOUNTER (OUTPATIENT)
Dept: MRI IMAGING | Age: 31
Discharge: HOME | End: 2021-01-25
Attending: FAMILY MEDICINE

## 2021-01-25 DIAGNOSIS — M25.562 ACUTE PAIN OF LEFT KNEE: ICD-10-CM

## 2021-01-25 DIAGNOSIS — S83.8X2A INJURY OF MENISCUS OF LEFT KNEE, INITIAL ENCOUNTER: ICD-10-CM

## 2021-01-25 DIAGNOSIS — R10.11 RIGHT UPPER QUADRANT ABDOMINAL PAIN: ICD-10-CM

## 2021-01-25 PROCEDURE — 73721 MRI JNT OF LWR EXTRE W/O DYE: CPT

## 2021-01-25 PROCEDURE — 76705 ECHO EXAM OF ABDOMEN: CPT

## 2021-01-27 ENCOUNTER — OFFICE VISIT (OUTPATIENT)
Dept: FAMILY MEDICINE CLINIC | Age: 31
End: 2021-01-27

## 2021-01-27 ENCOUNTER — TELEPHONE (OUTPATIENT)
Dept: FAMILY MEDICINE CLINIC | Age: 31
End: 2021-01-27

## 2021-01-27 DIAGNOSIS — Z71.89 COUNSELING AND COORDINATION OF CARE: Primary | ICD-10-CM

## 2021-01-27 DIAGNOSIS — M23.301 DEGENERATIVE TEAR OF LATERAL MENISCUS, LEFT: Primary | ICD-10-CM

## 2021-01-27 PROCEDURE — 99080 SPECIAL REPORTS OR FORMS: CPT | Performed by: FAMILY MEDICINE

## 2021-01-27 NOTE — PROGRESS NOTES
MRI confirms a meniscus tear  We will refer to orthopedic surgery for evaluation and management  Patient can be informed

## 2021-01-28 ENCOUNTER — OFFICE VISIT (OUTPATIENT)
Dept: FAMILY MEDICINE CLINIC | Age: 31
End: 2021-01-28

## 2021-01-28 DIAGNOSIS — Z71.89 COUNSELING AND COORDINATION OF CARE: Primary | ICD-10-CM

## 2021-01-28 PROCEDURE — 99080 SPECIAL REPORTS OR FORMS: CPT | Performed by: FAMILY MEDICINE

## 2021-01-28 NOTE — TELEPHONE ENCOUNTER
Florian to the pt Kiana Longoria was the . Tc x2. No answer. A message was left to contact the [facility] for a message. The call was being made to return her call. She had called the front office for results of lab.

## 2021-01-29 NOTE — PROGRESS NOTES
AN financial screening has been completed. Patient has been advised to call AN appointment line on or after 2/11/21.

## 2021-02-01 NOTE — PROGRESS NOTES
Tc to the pt with Faizan Pulse as .  The pt was given the providers message that the U/S was normal. Jimmy Stevens RN

## 2021-02-01 NOTE — PROGRESS NOTES
Tc to the pt she was given the providers message. Regarding the Meniscus tear she has been referred to AN and the AN application has been completed and she was reminded to call the AN appt line for an appt on or after 02/11/21. The pt then asked why is my abdomen still hurting if my U/S was normal. The pt was recommended to return for an appt for further evaluation by the provider for the stomach pain.  Nick Quevedo RN

## 2021-02-25 ENCOUNTER — OFFICE VISIT (OUTPATIENT)
Dept: FAMILY MEDICINE CLINIC | Age: 31
End: 2021-02-25

## 2021-02-25 DIAGNOSIS — Z71.89 COUNSELING AND COORDINATION OF CARE: Primary | ICD-10-CM

## 2021-02-25 PROCEDURE — 99080 SPECIAL REPORTS OR FORMS: CPT | Performed by: FAMILY MEDICINE

## 2021-04-12 DIAGNOSIS — R52 PAIN: Primary | ICD-10-CM

## 2021-07-22 ENCOUNTER — TELEPHONE (OUTPATIENT)
Dept: FAMILY MEDICINE CLINIC | Age: 31
End: 2021-07-22

## 2021-08-02 NOTE — ED TRIAGE NOTES
Presented pt's situation to , will be seen later today by CM d/t needed to attend  meeting.   Pt arrived with c/o L ear pain X 2 weeks. Evy Carty diagnosed her with ear infection and placed her on antibiotics, but states the pain is no better.

## 2021-08-13 ENCOUNTER — HOSPITAL ENCOUNTER (EMERGENCY)
Age: 31
Discharge: HOME | End: 2021-08-13
Attending: EMERGENCY MEDICINE

## 2021-08-13 VITALS
TEMPERATURE: 99.1 F | RESPIRATION RATE: 16 BRPM | OXYGEN SATURATION: 99 % | HEART RATE: 92 BPM | WEIGHT: 171.8 LBS | HEIGHT: 62 IN | SYSTOLIC BLOOD PRESSURE: 128 MMHG | DIASTOLIC BLOOD PRESSURE: 80 MMHG | BODY MASS INDEX: 31.62 KG/M2

## 2021-08-13 DIAGNOSIS — H66.90 ACUTE OTITIS MEDIA, UNSPECIFIED OTITIS MEDIA TYPE: Primary | ICD-10-CM

## 2021-08-13 PROCEDURE — 99282 EMERGENCY DEPT VISIT SF MDM: CPT

## 2021-08-13 RX ORDER — ALBUTEROL SULFATE 90 UG/1
2 AEROSOL, METERED RESPIRATORY (INHALATION)
Qty: 1 INHALER | Refills: 0 | Status: SHIPPED | OUTPATIENT
Start: 2021-08-13 | End: 2022-05-11 | Stop reason: ALTCHOICE

## 2021-08-13 RX ORDER — TRIAMCINOLONE ACETONIDE 55 UG/1
2 SPRAY, METERED NASAL DAILY
Qty: 1 BOTTLE | Refills: 0 | Status: SHIPPED | OUTPATIENT
Start: 2021-08-13

## 2021-08-13 RX ORDER — PSEUDOEPHEDRINE HCL 30 MG
60 TABLET ORAL
Qty: 20 TABLET | Refills: 0 | Status: SHIPPED | OUTPATIENT
Start: 2021-08-13 | End: 2022-05-11 | Stop reason: ALTCHOICE

## 2021-08-13 RX ORDER — AMOXICILLIN 500 MG/1
500 TABLET, FILM COATED ORAL 3 TIMES DAILY
Qty: 30 TABLET | Refills: 0 | Status: SHIPPED | OUTPATIENT
Start: 2021-08-13 | End: 2021-08-23

## 2021-08-13 NOTE — ED PROVIDER NOTES
Patient is a [phone] female with past medical history significant for bronchitis after having the flu some 9 years ago who presents emergency department for right ear pain. She states that recently she was exposed to fumes from paints and that it caused some coughing and some irritation in her airways. She states that a few days ago she started to have right ear pain that has gotten worse. Denies any drainage from the ear or fever. Denies any shortness of breath or cough. States she is not immunized against Covid. .           Past Medical History:   Diagnosis Date    Abdominal pain, generalized 2013    Acute bronchitis 2013    Allergic rhinitis, cause unspecified 2013    Anxiety state, unspecified 2013    Cardiac murmur, undiagnosed 2013    Carpal tunnel syndrome 2013    Contact dermatitis and other eczema, due to unspecified cause 2013    Gestational diabetes     Headache(784.0) 2013    Low back pain 2013    Other acute sinusitis 2013    Other chest pain 2013    Pap smear for cervical cancer screening 2012    Normal    Pap smear for cervical cancer screening 2012    Shoulder pain 2013    Throat pain 2013    URI 2013       Past Surgical History:   Procedure Laterality Date    GA  DELIVERY ONLY           Family History:   Problem Relation Age of Onset    Hypertension Mother        Social History     Socioeconomic History    Marital status: SINGLE     Spouse name: Not on file    Number of children: Not on file    Years of education: Not on file    Highest education level: Not on file   Occupational History    Not on file   Tobacco Use    Smoking status: Never Smoker    Smokeless tobacco: Never Used   Substance and Sexual Activity    Alcohol use:  Yes    Drug use: No    Sexual activity: Yes     Partners: Male     Birth control/protection: None, Pill   Other Topics Concern    Not on file   Social History Narrative    ** Merged History Encounter **          Social Determinants of Health     Financial Resource Strain:     Difficulty of Paying Living Expenses:    Food Insecurity:     Worried About Running Out of Food in the Last Year:     [address] in the Last Year:    Transportation Needs:     Lack of Transportation (Medical):  Lack of Transportation (Non-Medical):    Physical Activity:     Days of Exercise per Week:     Minutes of Exercise per Session:    Stress:     Feeling of Stress :    Social Connections:     Frequency of Communication with Friends and Family:     Frequency of Social Gatherings with Friends and Family:     Attends [employer] Services:     Active Member of Clubs or Organizations:     Attends Club or Organization Meetings:     Marital Status:    Intimate Partner Violence:     Fear of Current or Ex-Partner:     Emotionally Abused:     Physically Abused:     Sexually Abused: ALLERGIES: Other medication    Review of Systems   Constitutional: Negative for diaphoresis and fever. HENT: Positive for ear pain. Negative for dental problem, drooling, ear discharge, nosebleeds, trouble swallowing and voice change. Eyes: Negative for photophobia. Respiratory: Negative for shortness of breath and stridor. Cardiovascular: Negative for chest pain. Gastrointestinal: Negative for abdominal pain. Musculoskeletal: Negative for neck pain. Skin: Negative for rash. Neurological: Negative for seizures and syncope. Psychiatric/Behavioral: Negative. Vitals:    08/13/21 1144 08/13/21 1151   BP: 128/80    Pulse: 92    Resp: 16    Temp: 99.1 °F (37.3 °C)    SpO2: 99% 99%   Weight: 77.9 kg (171 lb 12.8 oz)    Height: 5' 2\" (1.575 m)             Physical Exam  Vitals and nursing note reviewed. Constitutional:       Appearance: Normal appearance. She is not ill-appearing, toxic-appearing or diaphoretic. HENT:      Head: Normocephalic and atraumatic. Jaw: There is normal jaw occlusion. Right Ear: No foreign body. No mastoid tenderness. Tympanic membrane is erythematous and bulging. Left Ear: Tympanic membrane, ear canal and external ear normal.      Nose: Nose normal.   Eyes:      General: No scleral icterus. Pupils: Pupils are equal, round, and reactive to light. Cardiovascular:      Rate and Rhythm: Normal rate. Pulmonary:      Effort: Pulmonary effort is normal.      Breath sounds: Wheezing (Mild expiratory wheezes on left) present. Musculoskeletal:      Cervical back: Neck supple. Right lower leg: No edema. Left lower leg: No edema. Lymphadenopathy:      Cervical: No cervical adenopathy. Skin:     General: Skin is warm and dry. Neurological:      Mental Status: She is alert and oriented to person, place, and time. Psychiatric:         Mood and Affect: Mood normal.         Thought Content: Thought content normal.         Judgment: Judgment normal.          MDM  Number of Diagnoses or Management Options  Acute otitis media, unspecified otitis media type  Diagnosis management comments: Patient refusing Covid testing. Given strict warning signs and symptoms to return and advised that any type for respiratory symptoms could potentially be Covid and that she needed to self isolate to protect against any potential spread especially in light of her not wanting to have the any testing today.          Procedures

## 2021-08-13 NOTE — Clinical Note
[facility]  [facility] EMERGENCY DEPT  [address] [id number]  [phone]    Work/School Note    Date: 8/13/2021     To Whom It May concern:    Aram Ochoa was evaulated by the following provider(s):  Attending Provider: Bruce Fonseca [address] [place] [address] virus is suspected. Per the CDC guidelines we recommend home isolation until the following conditions are all met:    1. At least 10 days have passed since symptoms first appeared and  2. At least 24 hours have passed since last fever without the use of fever-reducing medications and  3.  Symptoms (e.g., cough, shortness of breath) have improved    Sincerely,          Corinne Castor, MD

## 2021-08-13 NOTE — ED TRIAGE NOTES
used [id number]  Pt arrives with c/o right ear pain x 4 days not relieved by pain meds. Denies ear drainage. Denies sore throat, fever, or mouth pain. States she was exposed to fa weird smell or gas 2 weeks ago. Not vaccinated for COVID.

## 2021-11-16 ENCOUNTER — VIRTUAL VISIT (OUTPATIENT)
Dept: FAMILY MEDICINE CLINIC | Age: 31
End: 2021-11-16

## 2021-11-16 DIAGNOSIS — H10.11 ACUTE ATOPIC CONJUNCTIVITIS OF RIGHT EYE: Primary | ICD-10-CM

## 2021-11-16 PROCEDURE — 99213 OFFICE O/P EST LOW 20 MIN: CPT | Performed by: FAMILY MEDICINE

## 2021-11-16 RX ORDER — AZELASTINE HYDROCHLORIDE 0.5 MG/ML
1 SOLUTION/ DROPS OPHTHALMIC 2 TIMES DAILY
Qty: 6 ML | Refills: 1 | Status: SHIPPED | OUTPATIENT
Start: 2021-11-16

## 2021-11-16 NOTE — PROGRESS NOTES
Intake done with the Int #     No vs equipment is available today. Coordination of Care  1. Have you been to the ER, urgent care clinic since your last visit? Hospitalized since your last visit? Yes When: [facility] in August.    2. Have you seen or consulted any other health care providers outside of the [address] [place] [address] since your last visit? Include any pap smears or colon screening. No    Does the patient need refills?  NO    Learning Assessment Complete? no  Depression Screening complete in the past 12 months? yes

## 2021-11-16 NOTE — PROGRESS NOTES
[facility] Int used for discharge. Reviewed medication with the pt. The Goodrx can not be texted to the pt unable to access the website.  Brendan Stewart RN

## 2021-11-16 NOTE — PROGRESS NOTES
Abelino Senior (: 1990) is a 33 y.o. female, established patient, here for evaluation of the following chief complaint(s):   Red Eye (10 days. Right eye. no drainage. feels like something is in the eyes at times. )       ASSESSMENT/PLAN:  1. Acute atopic conjunctivitis of right eye  Treat with Optivar. Seasonal and dry air may be contributing. No follow-ups on file. SUBJECTIVE/OBJECTIVE:  HPI Doxy. me visit. Rt eye red: Rt eye irritated off/on x 10 days. When it flares it will get red on one edge. Last 4 days is using allergy eye drops which help but not completely resolved. Hx of allergies. Review of Systems   Constitutional: Negative for chills and fever. Eyes: Positive for redness. Negative for photophobia, pain, discharge and visual disturbance. No data recorded    Physical Exam   CONSTITUTIONAL:  Well developed. No apparent distress. PSYCHIATRIC: Oriented to time, place, person & situation. Appropriate mood and affect. EYE: EOEMi, PERRL. No photophobia. Lids without swelling. Conjunctiva without erythema. Sclera without erythema. No cornea abnormalities noted. Abelino Senior, was evaluated through a synchronous (real-time) audio-video encounter. The patient (or guardian if applicable) is aware that this is a billable service. Verbal consent to proceed has been obtained within the past 12 months. The visit was conducted pursuant to the emergency declaration under the [facility], [address] [place] [address] and the [employer] and [employer] General Act. Patient identification was verified, and a caregiver was present when appropriate. The patient was located in a state where the provider was credentialed to provide care.      Patient identification was verified at the start of the visit: YES    Services were provided through a video synchronous discussion virtually to substitute for in-person clinic visit. Patient was located at home and provider was located in office or at home. An electronic signature was used to authenticate this note.   -- Tracy Marmolejo MD

## 2021-11-23 ENCOUNTER — VIRTUAL VISIT (OUTPATIENT)
Dept: FAMILY MEDICINE CLINIC | Age: 31
End: 2021-11-23

## 2021-11-23 DIAGNOSIS — J01.00 ACUTE NON-RECURRENT MAXILLARY SINUSITIS: Primary | ICD-10-CM

## 2021-11-23 DIAGNOSIS — J30.89 ENVIRONMENTAL AND SEASONAL ALLERGIES: ICD-10-CM

## 2021-11-23 PROCEDURE — 99442 PR PHYS/QHP TELEPHONE EVALUATION 11-20 MIN: CPT | Performed by: FAMILY MEDICINE

## 2021-11-23 RX ORDER — AMOXICILLIN AND CLAVULANATE POTASSIUM 875; 125 MG/1; MG/1
1 TABLET, FILM COATED ORAL 2 TIMES DAILY
Qty: 20 TABLET | Refills: 0 | Status: SHIPPED | OUTPATIENT
Start: 2021-11-23 | End: 2021-12-03

## 2021-11-23 NOTE — PROGRESS NOTES
Coordination of Care  1. Have you been to the ER, urgent care clinic since your last visit? Hospitalized since your last visit? No    2. Have you seen or consulted any other health care providers outside of the [address] [place] [address] since your last visit? Include any pap smears or colon screening. No    Does the patient need refills?  NO    Learning Assessment Complete? no  Depression Screening complete in the past 12 months? yes

## 2021-11-23 NOTE — PROGRESS NOTES
Abelino Senior (: 1990) is a 33 y.o. female, established patient, here for evaluation of the following chief complaint(s):   Eye Problem (and right ear. this week and head pain. )       ASSESSMENT/PLAN:  1. Acute non-recurrent maxillary sinusitis  Sx have worsened and suggest sinus infection, tx with Augmentin. -     amoxicillin-clavulanate (AUGMENTIN) 875-125 mg per tablet; Take 1 Tablet by mouth two (2) times a day for 10 days. , Normal, Disp-20 Tablet, R-0  2. Environmental and seasonal allergies  Continue with Claritin and restart Nasacort. Also recommended using saline nasal spray. No follow-ups on file. SUBJECTIVE/OBJECTIVE:  HPI  Eye pain:  Rt eye continues to be irritated. Now starting with Rt ear pain and Rt sided headache. Had chronic allergies. During this season is having more sinus congestion, worse in AM.  Using Claritin but is not using Nasacort consistently. Review of Systems   Constitutional: Negative for chills, fatigue and fever. HENT: Positive for congestion and ear pain. Negative for sore throat. Eyes: Positive for pain and redness. Respiratory: Negative for cough. No data recorded    Physical Exam    On this date 2021 I have spent 15 minutes reviewing previous notes, test results and face to face (virtual) with the patient discussing the diagnosis and importance of compliance with the treatment plan as well as documenting on the day of the visit. Abelino Senior, was evaluated through a synchronous (real-time) audio-video encounter. The patient (or guardian if applicable) is aware that this is a billable service. Verbal consent to proceed has been obtained within the past 12 months. The visit was conducted pursuant to the emergency declaration under the [facility], [address] [place] [address] authority and the [employer] and [employer] General Act.   Patient identification was verified, and a caregiver was present when appropriate. The patient was located in a state where the provider was credentialed to provide care. Patient identification was verified at the start of the visit: YES    Services were provided through a phone synchronous discussion virtually to substitute for in-person clinic visit. Patient was located at home and provider was located in office or at home. An electronic signature was used to authenticate this note.   -- Torres Pond MD

## 2021-11-23 NOTE — PATIENT INSTRUCTIONS
Para congestion nasal use un spray nasal: (Flonase, Rhinocort AQ, o Nasacort) 2 spray cada lado de la nariz norm vez diaria. Use un enjuague de agua salina por la [employer] por chris. Enjuagues preparados se llaman The \[facility]\"", [address] [place] [address], [place] [employer], y [facility].

## 2021-12-08 ENCOUNTER — TELEPHONE (OUTPATIENT)
Dept: FAMILY MEDICINE CLINIC | Age: 31
End: 2021-12-08

## 2021-12-08 NOTE — TELEPHONE ENCOUNTER
Patient left a message in the voicemail. I returned the phone call. She requested an appointment, but I had to explained the appointment/morning line protocol. She acknowledged understanding, and will try again later this week.

## 2022-01-18 ENCOUNTER — VIRTUAL VISIT (OUTPATIENT)
Dept: FAMILY MEDICINE CLINIC | Age: 32
End: 2022-01-18

## 2022-01-18 DIAGNOSIS — Z71.89 COUNSELING AND COORDINATION OF CARE: Primary | ICD-10-CM

## 2022-01-18 NOTE — PROGRESS NOTES
V.V. patient has been screened. Patient has been scheduled for 1/24/22 at [facility] to complete AN financial screening process. Per patient's request, information of appointment and  Address has been sent via text.

## 2022-01-24 ENCOUNTER — OFFICE VISIT (OUTPATIENT)
Dept: FAMILY MEDICINE CLINIC | Age: 32
End: 2022-01-24

## 2022-01-24 DIAGNOSIS — Z71.89 COUNSELING AND COORDINATION OF CARE: Primary | ICD-10-CM

## 2022-01-24 PROCEDURE — 99080 SPECIAL REPORTS OR FORMS: CPT | Performed by: FAMILY MEDICINE

## 2022-01-24 NOTE — PROGRESS NOTES
AN financial screening for renewal has been completed. Patient has been instructed to call appointment line on or after 2/7/22. Patient has been screened for [facility]. Patient has declined. [facility]    Phone Message    May a detailed message be left on voicemail: yes     Reason for Call: The patient called regarding procedure scheduled 4/27/21. She was not aware this was scheduled and would like to discuss. Thank you.    Action Taken: Message routed to:  Adult Clinics: Urology p [phone]    Travel Screening: Not Applicable

## 2022-01-31 ENCOUNTER — DOCUMENTATION ONLY (OUTPATIENT)
Dept: FAMILY MEDICINE CLINIC | Age: 32
End: 2022-01-31

## 2022-01-31 NOTE — PROGRESS NOTES
Patient is receiving continuing care from her [facility] specialist. Patient's financial screening for enrollment renewal was emailed to [facility]. Patient to call the [facility] appointment line, [phone], on/after 02-07-22 to discuss her enrollment status.  Martina Villegas RN

## 2022-03-20 PROBLEM — H65.92 LEFT NON-SUPPURATIVE OTITIS MEDIA: Status: ACTIVE | Noted: 2018-03-27

## 2022-05-09 ENCOUNTER — OFFICE VISIT (OUTPATIENT)
Dept: FAMILY MEDICINE CLINIC | Age: 32
End: 2022-05-09

## 2022-05-09 ENCOUNTER — HOSPITAL ENCOUNTER (OUTPATIENT)
Dept: LAB | Age: 32
Discharge: HOME | End: 2022-05-09

## 2022-05-09 VITALS
BODY MASS INDEX: 32.07 KG/M2 | HEART RATE: 81 BPM | SYSTOLIC BLOOD PRESSURE: 116 MMHG | TEMPERATURE: 98.2 F | DIASTOLIC BLOOD PRESSURE: 70 MMHG | OXYGEN SATURATION: 99 % | HEIGHT: 63 IN | WEIGHT: 181 LBS

## 2022-05-09 DIAGNOSIS — R10.9 ABDOMINAL PAIN, UNSPECIFIED ABDOMINAL LOCATION: ICD-10-CM

## 2022-05-09 DIAGNOSIS — R10.9 ABDOMINAL PAIN, UNSPECIFIED ABDOMINAL LOCATION: Primary | ICD-10-CM

## 2022-05-09 PROCEDURE — 80053 COMPREHEN METABOLIC PANEL: CPT

## 2022-05-09 PROCEDURE — 83690 ASSAY OF LIPASE: CPT

## 2022-05-09 PROCEDURE — 85025 COMPLETE CBC W/AUTO DIFF WBC: CPT

## 2022-05-09 PROCEDURE — 80074 ACUTE HEPATITIS PANEL: CPT

## 2022-05-09 PROCEDURE — 99213 OFFICE O/P EST LOW 20 MIN: CPT | Performed by: FAMILY MEDICINE

## 2022-05-09 RX ORDER — ASPIRIN 325 MG
325 TABLET, DELAYED RELEASE (ENTERIC COATED) ORAL 2 TIMES DAILY WITH MEALS
COMMUNITY
Start: 2021-12-02 | End: 2022-05-11 | Stop reason: ALTCHOICE

## 2022-05-09 RX ORDER — CETIRIZINE HCL 10 MG
10 TABLET ORAL DAILY
COMMUNITY

## 2022-05-09 RX ORDER — HYDROCODONE BITARTRATE AND ACETAMINOPHEN 5; 325 MG/1; MG/1
1 TABLET ORAL
COMMUNITY
Start: 2021-12-02 | End: 2022-05-11 | Stop reason: ALTCHOICE

## 2022-05-09 NOTE — PROGRESS NOTES
10/30/18 MRI f/u with Dr Destinee Chisholm America Montero is a 27 y.o. female   Chief Complaint   Patient presents with    Abdominal Pain     patient c/o pain on abdomen and radiates to right side of back w/ nausea and diarrhea. ASSESSMENT AND PLAN:    1. Abdominal pain, unspecified abdominal location  VSS. Not an acute abdomen. Labs today. Continue omeprazole. Can continue pepto bismal.  Will likely refer to GI if symptoms persist, pending lab results. - H PYLORI AG, STOOL; Future  - METABOLIC PANEL, COMPREHENSIVE; Future  - LIPASE; Future  - HEPATITIS PANEL, ACUTE; Future  - CBC WITH AUTOMATED DIFF; Future    SUBJECTIVE:    HPI:  America Montero is a 27 y.o. female who presents with abdominal pain starting in her epigastrium and radiating around her right side to her right back. She states that about a month and a half ago she she had the pain with blackish diarrhea but it only lasted one day. 2 days ago in the early morning she developed the pain and it has persisted. She has been avoiding food, but if she eats she develops diarrhea immediately after. Last night she had to go 6 times after eating chicken soup. It is watery and dark in color. She had nausea on Saturday but it has improved. No gross blood in stool. She endorses bloating and decreased appetite. Pain is aggrevated by twisting. She has noticed spicy food causes a lot of burning pain. No fevers, No jaundice. She had similar symptoms last January and had a normal RUQUS, CMP,CBC and UA. States her symptoms were mostly resolved until 6 weeks ago. She started taking omeprazole OTC last night and it did help. She has taken pepto bismal with minimal improvement. Review of Systems   Constitutional: Negative for fever, malaise/fatigue and weight loss. Eyes: Negative for blurred vision. Respiratory: Negative for cough and shortness of breath. Cardiovascular: Negative for chest pain, palpitations and leg swelling.    Gastrointestinal: Positive for abdominal pain, diarrhea, heartburn and nausea. Negative for blood in stool, constipation and vomiting. Genitourinary: Negative for dysuria, flank pain, frequency, hematuria and urgency. Neurological: Negative for dizziness and headaches. /70 (BP 1 Location: Left upper arm, BP Patient Position: Sitting)   Pulse 81   Temp 98.2 °F (36.8 °C) (Temporal)   Ht 5' 3.47\" (1.612 m)   Wt 181 lb (82.1 kg)   LMP 04/18/2022   SpO2 99%   BMI 31.59 kg/m²     Physical Exam  Constitutional:       General: She is not in acute distress. Appearance: Normal appearance. HENT:      Mouth/Throat:      Mouth: Mucous membranes are moist.      Pharynx: Oropharynx is clear. No oropharyngeal exudate or posterior oropharyngeal erythema. Eyes:      Extraocular Movements: Extraocular movements intact. Conjunctiva/sclera: Conjunctivae normal.      Pupils: Pupils are equal, round, and reactive to light. Cardiovascular:      Rate and Rhythm: Normal rate and regular rhythm. Heart sounds: Normal heart sounds. Pulmonary:      Effort: Pulmonary effort is normal.      Breath sounds: Normal breath sounds. Abdominal:      General: Bowel sounds are normal. There is no distension. Tenderness: There is abdominal tenderness (between RUQ and epigastrium). There is no right CVA tenderness, left CVA tenderness or guarding. Neurological:      Mental Status: She is alert.

## 2022-05-09 NOTE — PROGRESS NOTES
I have printed AVS and reviewed it with patient today. Patient verbalized understanding. I reviewed with patient's OTC medications and how the medication is taken. Patient verbalized understanding. I explained to the patient to advance the diet as tolerated and which foods to avoid. Patient verbalized understanding. The patient was given the requisition for the ordered labs and given the information for the off-site lab facilities. I explained to the patient how to obtain the H-Pylori and to drop it off within 24 hours of obtaining and to refrigerate the specimen after obtaining. Patient verbalized understanding. I instructed patient to schedule a follow-up appointment prior to leaving today. Patient verbalized understanding. Patient correctly stated her full name and date of birth prior to the information shared.  Carmina with the [place] [id number] assisted with this discharge.  Cash Jimenez RN

## 2022-05-09 NOTE — PROGRESS NOTES
Coordination of Care  1. Have you been to the ER, urgent care clinic since your last visit? Hospitalized since your last visit? No    2. Have you seen or consulted any other health care providers outside of the [address] [place] [address] since your last visit? Include any pap smears or colon screening. No    Does the patient need refills? NO    Learning Assessment Complete?  yes  Depression Screening complete in the past 12 months? yes

## 2022-05-10 LAB
ALBUMIN SERPL-MCNC: 4.2 G/DL (ref 3.5–5)
ALBUMIN/GLOB SERPL: 1.1 {RATIO} (ref 1.1–2.2)
ALP SERPL-CCNC: 88 U/L (ref 45–117)
ALT SERPL-CCNC: 35 U/L (ref 12–78)
ANION GAP SERPL CALC-SCNC: 7 MMOL/L (ref 5–15)
AST SERPL-CCNC: 28 U/L (ref 15–37)
BASOPHILS # BLD: 0.1 K/UL (ref 0–0.1)
BASOPHILS NFR BLD: 1 % (ref 0–1)
BILIRUB SERPL-MCNC: 0.2 MG/DL (ref 0.2–1)
BUN SERPL-MCNC: 8 MG/DL (ref 6–20)
BUN/CREAT SERPL: 11 (ref 12–20)
CALCIUM SERPL-MCNC: 8.8 MG/DL (ref 8.5–10.1)
CHLORIDE SERPL-SCNC: 106 MMOL/L (ref 97–108)
CO2 SERPL-SCNC: 23 MMOL/L (ref 21–32)
COMMENT, HOLDF: NORMAL
CREAT SERPL-MCNC: 0.76 MG/DL (ref 0.55–1.02)
DIFFERENTIAL METHOD BLD: ABNORMAL
EOSINOPHIL # BLD: 0.3 K/UL (ref 0–0.4)
EOSINOPHIL NFR BLD: 5 % (ref 0–7)
ERYTHROCYTE [DISTWIDTH] IN BLOOD BY AUTOMATED COUNT: 12.5 % (ref 11.5–14.5)
GLOBULIN SER CALC-MCNC: 3.8 G/DL (ref 2–4)
GLUCOSE SERPL-MCNC: 90 MG/DL (ref 65–100)
HAV IGM SER QL: NONREACTIVE
HBV CORE IGM SER QL: NONREACTIVE
HBV SURFACE AG SER QL: <0.1 INDEX
HBV SURFACE AG SER QL: NEGATIVE
HCT VFR BLD AUTO: 42.5 % (ref 35–47)
HCV AB SERPL QL IA: NONREACTIVE
HGB BLD-MCNC: 13.6 G/DL (ref 11.5–16)
IMM GRANULOCYTES # BLD AUTO: 0.1 K/UL (ref 0–0.04)
IMM GRANULOCYTES NFR BLD AUTO: 1 % (ref 0–0.5)
LIPASE SERPL-CCNC: 118 U/L (ref 73–393)
LYMPHOCYTES # BLD: 2 K/UL (ref 0.8–3.5)
LYMPHOCYTES NFR BLD: 39 % (ref 12–49)
MCH RBC QN AUTO: 29.7 PG (ref 26–34)
MCHC RBC AUTO-ENTMCNC: 32 G/DL (ref 30–36.5)
MCV RBC AUTO: 92.8 FL (ref 80–99)
MONOCYTES # BLD: 0.4 K/UL (ref 0–1)
MONOCYTES NFR BLD: 7 % (ref 5–13)
NEUTS SEG # BLD: 2.3 K/UL (ref 1.8–8)
NEUTS SEG NFR BLD: 47 % (ref 32–75)
NRBC # BLD: 0 K/UL (ref 0–0.01)
NRBC BLD-RTO: 0 PER 100 WBC
PLATELET # BLD AUTO: 283 K/UL (ref 150–400)
POTASSIUM SERPL-SCNC: 4.5 MMOL/L (ref 3.5–5.1)
PROT SERPL-MCNC: 8 G/DL (ref 6.4–8.2)
RBC # BLD AUTO: 4.58 M/UL (ref 3.8–5.2)
RBC MORPH BLD: ABNORMAL
SAMPLES BEING HELD,HOLD: NORMAL
SODIUM SERPL-SCNC: 136 MMOL/L (ref 136–145)
SP1: NORMAL
SP2: NORMAL
SP3: NORMAL
WBC # BLD AUTO: 5.2 K/UL (ref 3.6–11)

## 2022-05-11 ENCOUNTER — VIRTUAL VISIT (OUTPATIENT)
Dept: FAMILY MEDICINE CLINIC | Age: 32
End: 2022-05-11

## 2022-05-11 ENCOUNTER — HOSPITAL ENCOUNTER (OUTPATIENT)
Dept: LAB | Age: 32
Discharge: HOME | End: 2022-05-11

## 2022-05-11 ENCOUNTER — LAB ONLY (OUTPATIENT)
Dept: FAMILY MEDICINE CLINIC | Age: 32
End: 2022-05-11

## 2022-05-11 DIAGNOSIS — R10.9 ABDOMINAL PAIN, UNSPECIFIED ABDOMINAL LOCATION: Primary | ICD-10-CM

## 2022-05-11 DIAGNOSIS — R10.9 ABDOMINAL PAIN, UNSPECIFIED ABDOMINAL LOCATION: ICD-10-CM

## 2022-05-11 PROCEDURE — 99212 OFFICE O/P EST SF 10 MIN: CPT | Performed by: FAMILY MEDICINE

## 2022-05-11 PROCEDURE — 87338 HPYLORI STOOL AG IA: CPT

## 2022-05-11 NOTE — PROGRESS NOTES
Tc to the pt with Juan Arnoldo. The pt verified her name and . It was explained to the pt how to get her H Pylori sample and where to take it today. The pt was on the way to [address] with the sample. She was explained the collectin process and the pt had collected the sample yesterday and had refrigerated it and was now on the way with it to drop it off.  Sachi Champagne RN

## 2022-05-11 NOTE — PROGRESS NOTES
Tc to the pt with Noah Snowden . The pt verified there name and . No vs equipment is available. Takes otc Zyrtec. Coordination of Care  1. Have you been to the ER, urgent care clinic since your last visit? Hospitalized since your last visit? No    2. Have you seen or consulted any other health care providers outside of the [address] [place] [address] since your last visit? Include any pap smears or colon screening. No    Does the patient need refills? NO    Learning Assessment Complete?  yes  Depression Screening complete in the past 12 months? yes

## 2022-05-11 NOTE — PROGRESS NOTES
Pee Pedroza is a 27 y.o. female   Chief Complaint   Patient presents with    Results     >>>>>>>>>>>>>TELEPHONE ENCOUNTER<<<<<<<<<<<<<<<<<<<<      ASSESSMENT AND PLAN:    1. Abdominal pain, unspecified abdominal location  Improving. Diarrhea resolved. Hep panel, CBC, Lipase and CMP normal.  Likely a viral gastroenteritis. Given more chronic gastritis symptoms, will proceed with H.Pylori test.    SUBJECTIVE:    HPI:  Pee Pedroza is a 27 y.o. female who presents via telephone for followup. She was seen 2 days ago 5/9 with abdominal pain radiating from her epigastrium around her right side to her right back. She was also having watery diarrhea and nausea. She reports that her symptoms have greatly improved. She no longer has diarrhea. Her abdominal pain is mild, mostly after eating. Some burning in her epigastrium (which is more chronic). She went out for a Mother's Day meal yesterday and it did bother her stomach. Otherwise she has been tolerating blander foods. Review of Systems   Constitutional: Negative for fever and malaise/fatigue. Respiratory: Negative. Cardiovascular: Negative. Gastrointestinal: Positive for abdominal pain and heartburn. Negative for blood in stool, diarrhea, nausea and vomiting. Genitourinary: Negative. LMP 04/18/2022     Physical Exam - not performed; telephone encounter.

## 2022-05-15 LAB
H PYLORI AG STL QL IA: NEGATIVE
SPECIMEN SOURCE: NORMAL

## 2022-06-01 NOTE — PROGRESS NOTES
Tc with the patient. I gave the patient the stool results per doctors following notes: \"H pylori negative, patient can be informed. \" Patient verbalized understanding

## 2022-11-21 ENCOUNTER — VIRTUAL VISIT (OUTPATIENT)
Dept: FAMILY MEDICINE CLINIC | Age: 32
End: 2022-11-21

## 2022-11-21 DIAGNOSIS — J11.1 INFLUENZA: Primary | ICD-10-CM

## 2022-11-21 PROCEDURE — 99442 PR PHYS/QHP TELEPHONE EVALUATION 11-20 MIN: CPT | Performed by: FAMILY MEDICINE

## 2022-11-21 RX ORDER — BENZONATATE 200 MG/1
200 CAPSULE ORAL
Qty: 21 CAPSULE | Refills: 0 | Status: SHIPPED | OUTPATIENT
Start: 2022-11-21 | End: 2022-11-28

## 2022-11-21 NOTE — PROGRESS NOTES
Tc to the pt with the int #   Tc to the pt 2x. She verified her name and . Coordination of Care  1. Have you been to the ER, urgent care clinic since your last visit? Hospitalized since your last visit? No    2. Have you seen or consulted any other health care providers outside of the [address] [place] [address] since your last visit? Include any pap smears or colon screening. No    Does the patient need refills?  No     Learning Assessment Complete? no  Depression Screening complete in the past 12 months? yes

## 2022-11-21 NOTE — PROGRESS NOTES
Tc to the pt regarding her discharge instructions. AMN # [id number]. The pt verified her name and . Her medication rx was reviewed with her. The Coupon for the medication was texted to her phone from the [employer] website. The pt confirmed receipt of the coupon and her Pharmacy.  Tony Salazar RN

## 2022-11-21 NOTE — PROGRESS NOTES
Ubaldo Medeiros (: 1990) is a 27 y.o. female, established patient, Virtual Visit for evaluation of the following chief complaint(s):   Flu Like Symptoms (Pr stated she does not have Covid symptoms but has fever and chills and coughing. )       ASSESSMENT/PLAN:  1. Influenza  NO severe sx. Will tx cough since that is her most bothersome sx. Discussed F2F eval if cough, fever persist or if sx change or worsen. No follow-ups on file. SUBJECTIVE/OBJECTIVE:  Cough:  fever, chills, fever x 1 week. Daughter and son both had Influenza A last week. Review of Systems   Constitutional:  Positive for chills and fever. HENT:  Positive for sore throat. Respiratory:  Positive for cough. Negative for shortness of breath and wheezing. Neurological:  Negative for headaches. Patient-Reported LMP: 10/27/22    Physical Exam    On this date 2022 I have spent 11 minutes reviewing previous notes, test results and face to face (virtual) with the patient discussing the diagnosis and importance of compliance with the treatment plan as well as documenting on the day of the visit. Ubaldo Medeiros, was evaluated through a synchronous (real-time) audio-video encounter. The patient (or guardian if applicable) is aware that this is a billable service, which includes applicable co-pays. This Virtual Visit was conducted with patient's (and/or legal guardian's) consent. The visit was conducted pursuant to the emergency declaration under the [address] [place] [address] and the [employer] Act. Patient identification was verified, and a caregiver was present when appropriate.   The patient was located at: Home: [address] [address]  The provider was located at: Home: [unfilled]     Patient identification was verified at the start of the visit: YES    Services were provided through a phone synchronous discussion virtually to substitute for in-person clinic visit. Patient was located at home and provider was located in office or at home. An electronic signature was used to authenticate this note.   -- Juan Francisco Cleveland MD

## 2023-08-28 ENCOUNTER — TELEMEDICINE (OUTPATIENT)
Age: 33
End: 2023-08-28

## 2023-08-28 DIAGNOSIS — M25.562 CHRONIC PAIN OF BOTH KNEES: Primary | ICD-10-CM

## 2023-08-28 DIAGNOSIS — G89.29 CHRONIC PAIN OF BOTH KNEES: Primary | ICD-10-CM

## 2023-08-28 DIAGNOSIS — M25.561 CHRONIC PAIN OF BOTH KNEES: Primary | ICD-10-CM

## 2023-08-28 PROCEDURE — 99441 PR PHYS/QHP TELEPHONE EVALUATION 5-10 MIN: CPT | Performed by: FAMILY MEDICINE

## 2023-08-28 SDOH — ECONOMIC STABILITY: TRANSPORTATION INSECURITY
IN THE PAST 12 MONTHS, HAS LACK OF TRANSPORTATION KEPT YOU FROM MEETINGS, WORK, OR FROM GETTING THINGS NEEDED FOR DAILY LIVING?: NO

## 2023-08-28 SDOH — ECONOMIC STABILITY: FOOD INSECURITY: WITHIN THE PAST 12 MONTHS, YOU WORRIED THAT YOUR FOOD WOULD RUN OUT BEFORE YOU GOT MONEY TO BUY MORE.: NEVER TRUE

## 2023-08-28 SDOH — ECONOMIC STABILITY: FOOD INSECURITY: WITHIN THE PAST 12 MONTHS, THE FOOD YOU BOUGHT JUST DIDN'T LAST AND YOU DIDN'T HAVE MONEY TO GET MORE.: NEVER TRUE

## 2023-08-28 SDOH — ECONOMIC STABILITY: HOUSING INSECURITY
IN THE LAST 12 MONTHS, WAS THERE A TIME WHEN YOU DID NOT HAVE A STEADY PLACE TO SLEEP OR SLEPT IN A SHELTER (INCLUDING NOW)?: NO

## 2023-08-28 SDOH — ECONOMIC STABILITY: FOOD INSECURITY: WITHIN THE PAST 12 MONTHS, THE FOOD YOU BOUGHT JUST DIDN'T LAST AND YOU DIDN'T HAVE MONEY TO GET MORE.: PATIENT DECLINED

## 2023-08-28 SDOH — ECONOMIC STABILITY: FOOD INSECURITY: WITHIN THE PAST 12 MONTHS, YOU WORRIED THAT YOUR FOOD WOULD RUN OUT BEFORE YOU GOT MONEY TO BUY MORE.: PATIENT DECLINED

## 2023-08-28 SDOH — ECONOMIC STABILITY: INCOME INSECURITY: HOW HARD IS IT FOR YOU TO PAY FOR THE VERY BASICS LIKE FOOD, HOUSING, MEDICAL CARE, AND HEATING?: NOT VERY HARD

## 2023-08-28 SDOH — ECONOMIC STABILITY: INCOME INSECURITY: HOW HARD IS IT FOR YOU TO PAY FOR THE VERY BASICS LIKE FOOD, HOUSING, MEDICAL CARE, AND HEATING?: PATIENT DECLINED

## 2023-08-28 ASSESSMENT — PATIENT HEALTH QUESTIONNAIRE - PHQ9
SUM OF ALL RESPONSES TO PHQ QUESTIONS 1-9: 1
2. FEELING DOWN, DEPRESSED OR HOPELESS: 0
SUM OF ALL RESPONSES TO PHQ9 QUESTIONS 1 & 2: 1
SUM OF ALL RESPONSES TO PHQ QUESTIONS 1-9: 1
1. LITTLE INTEREST OR PLEASURE IN DOING THINGS: 1

## 2023-08-28 NOTE — PROGRESS NOTES
[address] [place] [address] (: 1990) is a 26 y.o. female, Established patient, Virtual Visit for evaluation of the following chief complaint(s):   Leg Pain (Both legs heavy and painful. No swelling. 1 month ago it started. The pain comes and go. When she stands up is when the pain starts from her knees down. She has tried sometines uses Ibuprofen and rests. It helps a little bit. ) and Dizziness (When I turn my head I feel a dizziness. 1 month ago it started. )       ASSESSMENT/PLAN:  1. Chronic pain of both knees  With some lightheadedness. Will have her make F2F visit for exam and labs. Return for follow up F2F next available for leg aches. .    SUBJECTIVE/OBJECTIVE:  VV for legs heavy. B leg aches:  4 weeks of B knee pain into lower legs after being on them for a while. Seems worse prior to getting her menses. Even trying to exercise and her aches worsen. Ibuprofen helps but temporary. Review of Systems   Weak feeling in head, almost dizzy. Patient-Reported Vitals  BP Observations: No, remote/electronic monitoring device was not used or able to be verified     Physical Exam    On this date 2023 I have spent 10 minutes reviewing previous notes, test results and face to face (virtual) with the patient discussing the diagnosis and importance of compliance with the treatment plan as well as documenting on the day of the visit. [address] [place] [address], was evaluated through a synchronous (real-time) audio-video encounter. The patient (or guardian if applicable) is aware that this is a billable service, which includes applicable co-pays. This Virtual Visit was conducted with patient's (and/or legal guardian's) consent. Patient identification was verified, and a caregiver was present when appropriate. The patient was located at Home: [address] [address]  Provider was located at Home ([address]): Re[place]!  Confirm you are appropriately licensed, registered, or

## 2023-08-28 NOTE — PROGRESS NOTES
I called the pt for intake with Jeni Fung . The pt verified her name and . Coordination of Care  1. Have you been to the ER, urgent care clinic since your last visit? Hospitalized since your last visit? No    2. Have you seen or consulted any other health care providers outside of the [address] [place] [address] since your last visit? Include any pap smears or colon screening. No  Does the patient need refills? No    Learning Assessment Complete? no  Depression Screening complete in the past 12 months? yes

## 2023-08-31 ENCOUNTER — HOSPITAL ENCOUNTER (OUTPATIENT)
Facility: HOSPITAL | Age: 33
Setting detail: SPECIMEN
Discharge: HOME | End: 2023-09-03

## 2023-08-31 PROCEDURE — 80053 COMPREHEN METABOLIC PANEL: CPT

## 2023-08-31 PROCEDURE — 85025 COMPLETE CBC W/AUTO DIFF WBC: CPT

## 2023-08-31 PROCEDURE — 36415 COLL VENOUS BLD VENIPUNCTURE: CPT

## 2023-08-31 PROCEDURE — 83036 HEMOGLOBIN GLYCOSYLATED A1C: CPT

## 2023-08-31 PROCEDURE — 82550 ASSAY OF CK (CPK): CPT

## 2023-09-01 LAB
ALBUMIN SERPL-MCNC: 4.5 G/DL (ref 3.5–5)
ALBUMIN/GLOB SERPL: 1.3 (ref 1.1–2.2)
ALP SERPL-CCNC: 66 U/L (ref 45–117)
ALT SERPL-CCNC: 42 U/L (ref 12–78)
ANION GAP SERPL CALC-SCNC: 3 MMOL/L (ref 5–15)
AST SERPL-CCNC: 31 U/L (ref 15–37)
BASOPHILS # BLD: 0 K/UL (ref 0–0.1)
BASOPHILS NFR BLD: 1 % (ref 0–1)
BILIRUB SERPL-MCNC: 0.3 MG/DL (ref 0.2–1)
BUN SERPL-MCNC: 12 MG/DL (ref 6–20)
BUN/CREAT SERPL: 14 (ref 12–20)
CALCIUM SERPL-MCNC: 9.8 MG/DL (ref 8.5–10.1)
CHLORIDE SERPL-SCNC: 106 MMOL/L (ref 97–108)
CK SERPL-CCNC: 453 U/L (ref 26–192)
CO2 SERPL-SCNC: 27 MMOL/L (ref 21–32)
CREAT SERPL-MCNC: 0.84 MG/DL (ref 0.55–1.02)
DIFFERENTIAL METHOD BLD: ABNORMAL
EOSINOPHIL # BLD: 0.2 K/UL (ref 0–0.4)
EOSINOPHIL NFR BLD: 2 % (ref 0–7)
ERYTHROCYTE [DISTWIDTH] IN BLOOD BY AUTOMATED COUNT: 12.9 % (ref 11.5–14.5)
EST. AVERAGE GLUCOSE BLD GHB EST-MCNC: 108 MG/DL
GLOBULIN SER CALC-MCNC: 3.5 G/DL (ref 2–4)
GLUCOSE SERPL-MCNC: 89 MG/DL (ref 65–100)
HBA1C MFR BLD: 5.4 % (ref 4–5.6)
HCT VFR BLD AUTO: 40.8 % (ref 35–47)
HGB BLD-MCNC: 12.5 G/DL (ref 11.5–16)
IMM GRANULOCYTES # BLD AUTO: 0 K/UL (ref 0–0.04)
IMM GRANULOCYTES NFR BLD AUTO: 0 % (ref 0–0.5)
LYMPHOCYTES # BLD: 2.8 K/UL (ref 0.8–3.5)
LYMPHOCYTES NFR BLD: 40 % (ref 12–49)
MCH RBC QN AUTO: 29.4 PG (ref 26–34)
MCHC RBC AUTO-ENTMCNC: 30.6 G/DL (ref 30–36.5)
MCV RBC AUTO: 96 FL (ref 80–99)
MONOCYTES # BLD: 0.4 K/UL (ref 0–1)
MONOCYTES NFR BLD: 6 % (ref 5–13)
NEUTS SEG # BLD: 3.6 K/UL (ref 1.8–8)
NEUTS SEG NFR BLD: 51 % (ref 32–75)
NRBC # BLD: 0 K/UL (ref 0–0.01)
NRBC BLD-RTO: 0 PER 100 WBC
PLATELET # BLD AUTO: 449 K/UL (ref 150–400)
PMV BLD AUTO: 10 FL (ref 8.9–12.9)
POTASSIUM SERPL-SCNC: 4.6 MMOL/L (ref 3.5–5.1)
PROT SERPL-MCNC: 8 G/DL (ref 6.4–8.2)
RBC # BLD AUTO: 4.25 M/UL (ref 3.8–5.2)
SODIUM SERPL-SCNC: 136 MMOL/L (ref 136–145)
WBC # BLD AUTO: 7.1 K/UL (ref 3.6–11)

## 2023-09-22 ENCOUNTER — OFFICE VISIT (OUTPATIENT)
Age: 33
End: 2023-09-22

## 2023-09-22 VITALS
RESPIRATION RATE: 16 BRPM | DIASTOLIC BLOOD PRESSURE: 76 MMHG | TEMPERATURE: 98.1 F | HEART RATE: 76 BPM | HEIGHT: 64 IN | SYSTOLIC BLOOD PRESSURE: 112 MMHG | WEIGHT: 190 LBS | BODY MASS INDEX: 32.44 KG/M2 | OXYGEN SATURATION: 96 %

## 2023-09-22 DIAGNOSIS — G89.29 CHRONIC PAIN OF RIGHT KNEE: Primary | ICD-10-CM

## 2023-09-22 DIAGNOSIS — M25.561 CHRONIC PAIN OF RIGHT KNEE: Primary | ICD-10-CM

## 2023-09-22 PROCEDURE — 99213 OFFICE O/P EST LOW 20 MIN: CPT | Performed by: NURSE PRACTITIONER

## 2023-09-22 SDOH — ECONOMIC STABILITY: INCOME INSECURITY: HOW HARD IS IT FOR YOU TO PAY FOR THE VERY BASICS LIKE FOOD, HOUSING, MEDICAL CARE, AND HEATING?: NOT HARD AT ALL

## 2023-09-22 SDOH — ECONOMIC STABILITY: FOOD INSECURITY: WITHIN THE PAST 12 MONTHS, YOU WORRIED THAT YOUR FOOD WOULD RUN OUT BEFORE YOU GOT MONEY TO BUY MORE.: NEVER TRUE

## 2023-09-22 SDOH — ECONOMIC STABILITY: FOOD INSECURITY: WITHIN THE PAST 12 MONTHS, THE FOOD YOU BOUGHT JUST DIDN'T LAST AND YOU DIDN'T HAVE MONEY TO GET MORE.: NEVER TRUE

## 2023-09-22 ASSESSMENT — PATIENT HEALTH QUESTIONNAIRE - PHQ9
SUM OF ALL RESPONSES TO PHQ QUESTIONS 1-9: 0
2. FEELING DOWN, DEPRESSED OR HOPELESS: 0
SUM OF ALL RESPONSES TO PHQ QUESTIONS 1-9: 0
SUM OF ALL RESPONSES TO PHQ9 QUESTIONS 1 & 2: 0
1. LITTLE INTEREST OR PLEASURE IN DOING THINGS: 0

## 2023-09-22 NOTE — PROGRESS NOTES
2023 : Mcneil Sandifer Coreas Aviles (: 1990) is a 26 y.o. female,  established patient, here for evaluation of the following chief complaint(s):  Results     ASSESSMENT/PLAN:  Below is the assessment and plan developed based on review of pertinent history, physical exam, labs, studies, and medications. 1. Chronic pain of right knee  -     XR KNEE RIGHT (3 VIEWS); Future  -     Amb External Referral To Orthopedic Surgery    Return for [facility], then follow up LK. Right knee no injury. -x-ray  -AN  -uses ibuprofen for pain, helps a little      SUBJECTIVE/OBJECTIVE:   HPI No supplements. Still feels a little bit bad. The dizziness has improved. Bottom of the feet and the knees. It is less than before. Previously was 4/10  No discomfort at all. 1 knee bothers her more, the right one. She had surgery  left knee. But now her right knee hurts.   Component      Latest Ref Rng 2023   WBC      3.6 - 11.0 K/uL 7.1    RBC      3.80 - 5.20 M/uL 4.25    Hemoglobin Quant      11.5 - 16.0 g/dL 12.5    Hematocrit      35.0 - 47.0 % 40.8    MCV      80.0 - 99.0 FL 96.0    MCH      26.0 - 34.0 PG 29.4    MCHC      30.0 - 36.5 g/dL 30.6    RDW      11.5 - 14.5 % 12.9    Platelet Count      084 - 400 K/uL 449 (H)    MPV      8.9 - 12.9 FL 10.0    Nucleated Red Blood Cells      0.00 - 0.01 K/uL 0.00    Nucleated Red Blood Cells      0  WBC 0.0    Neutrophils %      32 - 75 % 51    Lymphocyte %      12 - 49 % 40    Monocytes %      5 - 13 % 6    Eosinophils %      0 - 7 % 2    Basophils %      0 - 1 % 1    Immature Granulocytes      0.0 - 0.5 % 0    Neutrophils Absolute      1.8 - 8.0 K/UL 3.6    Lymphocytes Absolute      0.8 - 3.5 K/UL 2.8    Monocytes Absolute      0.0 - 1.0 K/UL 0.4    Eosinophils Absolute      0.0 - 0.4 K/UL 0.2    Basophils Absolute      0.0 - 0.1 K/UL 0.0    Absolute Immature Granulocyte      0.00 - 0.04 K/UL 0.0    Differential Type        AUTOMATED

## 2023-09-22 NOTE — PROGRESS NOTES
[address] [place] [address] seen at d/c, full name and  verified, given After visit Summary and reviewed today's visit with patient along with instructions on when it is recommended to come back. Explained procedure for obtaining X-ray as a walk-in. Provided handout of all of our imaging locations. Told patient that a [facility] representative will call her after we receive the results. Patient advised to set up a medical bills appt with our social workers/outreach workers to help with the financial assistance application as soon as he receives the bills of the imaging. Patient was explained that these services are NOT free. Patient was explained the Care Card process and that she might receive a bill. Patient was explained that once bill is received that she is to set up an appt with our O.W to start on the financial assistance application that is based on the patient's income. Patient was advised that she will be contacted by a [facility] Coordinator in regards to her [address] referral. Patient was notified that [facility] has its own financial screening.

## 2023-09-27 ENCOUNTER — HOSPITAL ENCOUNTER (OUTPATIENT)
Facility: HOSPITAL | Age: 33
Discharge: HOME | End: 2023-09-30

## 2023-09-27 DIAGNOSIS — M25.561 CHRONIC PAIN OF RIGHT KNEE: ICD-10-CM

## 2023-09-27 DIAGNOSIS — G89.29 CHRONIC PAIN OF RIGHT KNEE: ICD-10-CM

## 2023-09-27 PROCEDURE — 73562 X-RAY EXAM OF KNEE 3: CPT

## 2023-10-31 ENCOUNTER — OFFICE VISIT (OUTPATIENT)
Age: 33
End: 2023-10-31

## 2023-10-31 DIAGNOSIS — Z71.89 COUNSELING AND COORDINATION OF CARE: Primary | ICD-10-CM

## 2023-10-31 PROCEDURE — 99080 SPECIAL REPORTS OR FORMS: CPT | Performed by: FAMILY MEDICINE

## 2023-10-31 PROCEDURE — 99080 SPECIAL REPORTS OR FORMS: CPT | Performed by: PHYSICIAN ASSISTANT

## 2023-10-31 SDOH — ECONOMIC STABILITY: HOUSING INSECURITY: IN THE LAST 12 MONTHS, HOW MANY PLACES HAVE YOU LIVED?: 1

## 2023-10-31 SDOH — ECONOMIC STABILITY: INCOME INSECURITY: IN THE LAST 12 MONTHS, WAS THERE A TIME WHEN YOU WERE NOT ABLE TO PAY THE MORTGAGE OR RENT ON TIME?: NO

## 2023-12-05 ENCOUNTER — TELEPHONE (OUTPATIENT)
Age: 33
End: 2023-12-05

## 2023-12-05 NOTE — TELEPHONE ENCOUNTER
Patient left a VM to the AN/PAP coordinator number inquiring about her AN referral to Ortho. I checked patient's chart in the [facility] portal - her referral has been processed and is ready for scheduling. I called the patient, confirmed name and . I gave her the AN appointment line number and told her to call to schedule. She took down the number and verbalized understanding.      Darlene Keita RN